# Patient Record
Sex: FEMALE | Race: WHITE | URBAN - METROPOLITAN AREA
[De-identification: names, ages, dates, MRNs, and addresses within clinical notes are randomized per-mention and may not be internally consistent; named-entity substitution may affect disease eponyms.]

---

## 2017-01-27 ENCOUNTER — TELEPHONE (OUTPATIENT)
Dept: FAMILY MEDICINE | Facility: OTHER | Age: 52
End: 2017-01-27

## 2017-01-27 NOTE — TELEPHONE ENCOUNTER
Summary:    Patient is due/failing the following:   MAMMOGRAM    Action needed:   Schedule a mammogram     Type of outreach:    Phone, left message for patient to call back.     Questions for provider review:    None                                                                                                                                    Caitlin Thompson       Chart routed to Care Team .      Panel Management Review      Patient has the following on her problem list: None      Composite cancer screening  Chart review shows that this patient is due/due soon for the following Mammogram

## 2017-03-17 ENCOUNTER — HOSPITAL ENCOUNTER (OUTPATIENT)
Dept: MAMMOGRAPHY | Facility: CLINIC | Age: 52
Discharge: HOME | End: 2017-03-17
Attending: FAMILY MEDICINE | Admitting: FAMILY MEDICINE
Payer: COMMERCIAL

## 2017-03-17 DIAGNOSIS — Z12.31 VISIT FOR SCREENING MAMMOGRAM: ICD-10-CM

## 2017-03-17 PROCEDURE — G0202 SCR MAMMO BI INCL CAD: HCPCS

## 2017-08-30 ENCOUNTER — TELEPHONE (OUTPATIENT)
Dept: FAMILY MEDICINE | Facility: OTHER | Age: 52
End: 2017-08-30
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
## 2017-08-31 NOTE — TELEPHONE ENCOUNTER
Spoke to patient who stated she does not have insurance and that is why she has not been seen. Patient said she is not working either but, patient did make an appointment.   Brisa Goddard CMA ([facility])

## 2017-08-31 NOTE — TELEPHONE ENCOUNTER
She can certainly request her records to submit as part of her petition, but I have not done a disability exam on her, and she's not seen me for over a year, so my information wouldn't be current anyway.  If she wants to pursue disability, I would recommend a visit with an occupational medicine doctor.  That is their specialty.
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
## 2017-09-21 ENCOUNTER — OFFICE VISIT (OUTPATIENT)
Dept: FAMILY MEDICINE | Facility: OTHER | Age: 52
End: 2017-09-21

## 2017-09-21 VITALS
DIASTOLIC BLOOD PRESSURE: 84 MMHG | BODY MASS INDEX: 32.39 KG/M2 | HEIGHT: 65 IN | RESPIRATION RATE: 16 BRPM | WEIGHT: 194.4 LBS | HEART RATE: 96 BPM | TEMPERATURE: 98.4 F | SYSTOLIC BLOOD PRESSURE: 108 MMHG

## 2017-09-21 DIAGNOSIS — G89.29 CHRONIC NECK PAIN: ICD-10-CM

## 2017-09-21 DIAGNOSIS — M79.10 MYALGIA: Primary | ICD-10-CM

## 2017-09-21 DIAGNOSIS — M54.2 CHRONIC NECK PAIN: ICD-10-CM

## 2017-09-21 PROCEDURE — 99214 OFFICE O/P EST MOD 30 MIN: CPT | Performed by: FAMILY MEDICINE

## 2017-09-21 RX ORDER — IBUPROFEN 800 MG/1
800 TABLET, FILM COATED ORAL EVERY 8 HOURS PRN
Qty: 270 TABLET | Refills: 1 | Status: SHIPPED | OUTPATIENT
Start: 2017-09-21

## 2017-09-21 RX ORDER — GABAPENTIN 600 MG/1
600 TABLET ORAL 3 TIMES DAILY
Qty: 270 TABLET | Refills: 3 | Status: CANCELLED | OUTPATIENT
Start: 2017-09-21

## 2017-09-21 RX ORDER — CYCLOBENZAPRINE HCL 10 MG
10 TABLET ORAL 2 TIMES DAILY PRN
Qty: 90 TABLET | Refills: 3 | Status: SHIPPED | OUTPATIENT
Start: 2017-09-21 | End: 2018-09-05

## 2017-09-21 RX ORDER — GABAPENTIN 300 MG/1
300 CAPSULE ORAL 3 TIMES DAILY
Qty: 270 CAPSULE | Refills: 3 | Status: SHIPPED | OUTPATIENT
Start: 2017-09-21 | End: 2018-04-05

## 2017-09-21 ASSESSMENT — PATIENT HEALTH QUESTIONNAIRE - PHQ9
SUM OF ALL RESPONSES TO PHQ QUESTIONS 1-9: 10
5. POOR APPETITE OR OVEREATING: SEVERAL DAYS

## 2017-09-21 ASSESSMENT — PAIN SCALES - GENERAL: PAINLEVEL: EXTREME PAIN (8)

## 2017-09-21 ASSESSMENT — ANXIETY QUESTIONNAIRES
IF YOU CHECKED OFF ANY PROBLEMS ON THIS QUESTIONNAIRE, HOW DIFFICULT HAVE THESE PROBLEMS MADE IT FOR YOU TO DO YOUR WORK, TAKE CARE OF THINGS AT HOME, OR GET ALONG WITH OTHER PEOPLE: NOT DIFFICULT AT ALL
GAD7 TOTAL SCORE: 4
1. FEELING NERVOUS, ANXIOUS, OR ON EDGE: SEVERAL DAYS
7. FEELING AFRAID AS IF SOMETHING AWFUL MIGHT HAPPEN: NOT AT ALL
6. BECOMING EASILY ANNOYED OR IRRITABLE: NOT AT ALL
2. NOT BEING ABLE TO STOP OR CONTROL WORRYING: SEVERAL DAYS
3. WORRYING TOO MUCH ABOUT DIFFERENT THINGS: SEVERAL DAYS
5. BEING SO RESTLESS THAT IT IS HARD TO SIT STILL: NOT AT ALL

## 2017-09-21 NOTE — NURSING NOTE
"Chief Complaint   Patient presents with     Discuss disability     Panel Management     Beatricerodneyt, Flu shot       Initial /84 (BP Location: Right arm, Patient Position: Chair, Cuff Size: Adult Regular)  Pulse 96  Temp 98.4  F (36.9  C) (Temporal)  Resp 16  Ht 5' 4.5\" (1.638 m)  Wt 194 lb 6.4 oz (88.2 kg)  BMI 32.85 kg/m2 Estimated body mass index is 32.85 kg/(m^2) as calculated from the following:    Height as of this encounter: 5' 4.5\" (1.638 m).    Weight as of this encounter: 194 lb 6.4 oz (88.2 kg).  Medication Reconciliation: complete  Abdelrahman Smallwood CMA    "

## 2017-09-21 NOTE — PATIENT INSTRUCTIONS
Thank you for visiting [facility]    Let's get you back on gabapentin to help with your pain and other symptoms.    I would recommend a consultation with occupational medicine if you'd like disability.  Sports medicine or neurology might also be options.    I will refer you to a care coordinator to help sort through things and hopefully help you get some insurance coverage.    Please see me in 1-3 months for follow up.       If you had imaging scheduled please refer to your radiology prep sheet.    Appointment    Date_______________     Time_____________    Day:   M TU W TH F    With____________________________    Location_________________________    If you need medication refills, please contact your pharmacy 3 days before your prescriptions runs out. If you are out of refills, your pharmacy will contact contact the clinic.    Contact us or return if questions or concerns.     -Your Care Team:  MD Vijaya Brown PA-C Kelly White, CNP    General information about your clinic      Clinic hours:     Lab hours:  Phone [phone]  Monday 7:30 am-7 pm    Monday 8:30 am-6:30 pm  Tuesday-Friday 7:30 am-5 pm   Tuesday-Friday 8:30 am-4:30 pm    Pharmacy hours:  Phone [phone]  Monday 8:30 am-7pm  Tuesday-Friday 8:30am-6 pm                                       Mychart assistance [phone]        We would like to hear from you, how was your visit today?    Gisell Chowdary  Patient Information Supervisor   Patient Care Supervisor  [facility] [place], and [facility], and [facility]  [phone]

## 2017-09-21 NOTE — MR AVS SNAPSHOT
After Visit Summary   9/21/2017    Zuri Reza    MRN: [id number]           Patient Information     Date Of Birth          1965        Visit Information        Provider Department      9/21/2017 1:15 PM Tom Cruz MD [facility]        Today's Diagnoses     Myalgia    -  1    Chronic neck pain          Care Instructions    Thank you for visiting [facility]    Let's get you back on gabapentin to help with your pain and other symptoms.    I would recommend a consultation with occupational medicine if you'd like disability.  Sports medicine or neurology might also be options.    I will refer you to a care coordinator to help sort through things and hopefully help you get some insurance coverage.    Please see me in 1-3 months for follow up.       If you had imaging scheduled please refer to your radiology prep sheet.    Appointment    Date_______________     Time_____________    Day:   M TU W TH F    With____________________________    Location_________________________    If you need medication refills, please contact your pharmacy 3 days before your prescriptions runs out. If you are out of refills, your pharmacy will contact contact the clinic.    Contact us or return if questions or concerns.     -Your Care Team:  MD Vijaya Brown, KUSH Candelaria, DIEGO    General information about your clinic      Clinic hours:     Lab hours:  Phone [phone]  Monday 7:30 am-7 pm    Monday 8:30 am-6:30 pm  Tuesday-Friday 7:30 am-5 pm   Tuesday-Friday 8:30 am-4:30 pm    Pharmacy hours:  Phone [phone]  Monday 8:30 am-7pm  Tuesday-Friday 8:30am-6 pm                                       Mychart assistance [phone]        We would like to hear from you, how was your visit today?    Gisell Chowdary  Patient Information Supervisor   Patient Care Supervisor  [facility], and [facility]  [place], and [facility]  [phone]             Follow-ups after your visit        Additional Services     CARE COORDINATION REFERRAL       Services are provided by a Care Coordinator for people with complex needs such as: medical, social, or financial troubles.  The Care Coordinator works with the patient and their Primary Care Provider to determine health goals, obtain resources, achieve outcomes, and develop care plans that help coordinate the patient's care.     Reason for Referral: Medication/Treatment Adherence Issues and Other Financial Concerns    Provide additional details for Care Coordination to best meet the patient's current needs: Pt is frustrated by lack of insurance, is looking at getting qualified for disability.      Clinical Staff have discussed the Care Coordination Referral with the patient and/or caregiver: yes            OCCUPATIONAL MEDICINE REFERRAL       Your provider has referred you to: [facility]  [phone]  [email or web address]  [facility] [phone] [email or web address]  [facility] [phone] [email or web address]    Please be aware that coverage of these services is subject to the terms and limitations of your health insurance plan.  Call member services at your health plan with any benefit or coverage questions.      Please bring the following to your appointment:  >>   Any x-rays, CTs or MRIs which have been performed.  Contact the facility where they were done to arrange for  prior to your scheduled appointment.    >>   List of current medications   >>   This referral request   >>   Any documents/labs given to you for this referral                  Who to contact     If you have questions or need follow up information about today's clinic visit or your schedule please contact  "[facility] directly at [phone].  Normal or non-critical lab and imaging results will be communicated to you by MyChart, letter or phone within 4 business days after the clinic has received the results. If you do not hear from us within 7 days, please contact the clinic through [employer] or phone. If you have a critical or abnormal lab result, we will notify you by phone as soon as possible.  Submit refill requests through [employer] or call your pharmacy and they will forward the refill request to us. Please allow 3 business days for your refill to be completed.          Additional Information About Your Visit        [employer] Information     [employer] lets you send messages to your doctor, view your test results, renew your prescriptions, schedule appointments and more. To sign up, go to [email or web address] . Click on \"Log in\" on the left side of the screen, which will take you to the Welcome page. Then click on \"Sign up Now\" on the right side of the page.     You will be asked to enter the access code listed below, as well as some personal information. Please follow the directions to create your username and password.     Your access code is: [email or web address]  Expires: 2017  1:37 PM     Your access code will  in 90 days. If you need help or a new code, please call your [place] clinic or [phone].        Care EveryWhere ID     This is your Care EveryWhere ID. This could be used by other organizations to access your [place] medical records  [id number]        Your Vitals Were     Pulse Temperature Respirations Height BMI (Body Mass Index)       96 98.4  F (36.9  C) (Temporal) 16 5' 4.5\" (1.638 m) 32.85 kg/m2        Blood Pressure from Last 3 Encounters:   17 108/84   10/07/16 101/77   16 106/72    Weight from Last 3 Encounters:   17 194 lb 6.4 oz (88.2 kg)   16 194 lb 6.4 oz (88.2 kg)   14 202 lb (91.6 kg)              We Performed the Following     CARE " COORDINATION REFERRAL     OCCUPATIONAL MEDICINE REFERRAL          Today's Medication Changes          These changes are accurate as of: 9/21/17  1:37 PM.  If you have any questions, ask your nurse or doctor.               These medicines have changed or have updated prescriptions.        Dose/Directions    * gabapentin 600 MG tablet   Commonly known as:  NEURONTIN   This may have changed:  Another medication with the same name was added. Make sure you understand how and when to take each.   Used for:  Chronic neck pain   Changed by:  Tom Cruz MD        Dose:  600 mg   Take 1 tablet (600 mg) by mouth 3 times daily   Quantity:  270 tablet   Refills:  3       * gabapentin 300 MG capsule   Commonly known as:  NEURONTIN   This may have changed:  You were already taking a medication with the same name, and this prescription was added. Make sure you understand how and when to take each.   Used for:  Chronic neck pain   Changed by:  Tom Cruz MD        Dose:  300 mg   Take 1 capsule (300 mg) by mouth 3 times daily   Quantity:  270 capsule   Refills:  3       ibuprofen 800 MG tablet   Commonly known as:  ADVIL/MOTRIN   This may have changed:  reasons to take this   Used for:  Chronic neck pain   Changed by:  Tom Cruz MD        Dose:  800 mg   Take 1 tablet (800 mg) by mouth every 8 hours as needed   Quantity:  270 tablet   Refills:  1       * Notice:  This list has 2 medication(s) that are the same as other medications prescribed for you. Read the directions carefully, and ask your doctor or other care provider to review them with you.         Where to get your medicines      [facility] [address] [place], [place] [address] [address] [address]     Phone:  [phone]     cyclobenzaprine 10 MG tablet    gabapentin 300 MG capsule    ibuprofen 800 MG tablet                Primary Care Provider Office Phone # Fax #    Tom Brito  MD Anthony [phone]       [address] [place] [address]        Equal Access to Services     LOBO LINDA : Hadmarva clemente breaux xi Michelle, wamagnoda luqheather, qaceliata kayanirada allan, debbie mcleod. So [facility] [phone].    ATENCIÓN: Si habla español, tiene a marquez disposición servicios gratuitos de asistencia lingüística. Llame al [phone].    We comply with applicable federal civil rights laws and [place] laws. We do not discriminate on the basis of race, color, national origin, age, disability sex, sexual orientation or gender identity.            Thank you!     Thank you for choosing [facility]  for your care. Our goal is always to provide you with excellent care. Hearing back from our patients is one way we can continue to improve our services. Please take a few minutes to complete the written survey that you may receive in the mail after your visit with us. Thank you!             Your Updated Medication List - Protect others around you: Learn how to safely use, store and throw away your medicines at www.disposemymeds.org.          This list is accurate as of: 9/21/17  1:37 PM.  Always use your most recent med list.                   Brand Name Dispense Instructions for use Diagnosis    * albuterol 108 (90 BASE) MCG/ACT Inhaler    PROAIR HFA/PROVENTIL HFA/VENTOLIN HFA    1 Inhaler    Inhale 2 puffs into the lungs every 4 hours as needed for shortness of breath / dyspnea.    Bronchitis with bronchospasm       * albuterol (2.5 MG/3ML) 0.083% neb solution     1 Box    Take 3 mLs by nebulization every 4 hours as needed for shortness of breath / dyspnea.    Cough       cyclobenzaprine 10 MG tablet    FLEXERIL    90 tablet    Take 1 tablet (10 mg) by mouth 2 times daily as needed for muscle spasms    Chronic neck pain       fluticasone 50 MCG/ACT spray    FLONASE    1 Package    Spray 1-2 sprays into both nostrils daily    Rhinitis, non-allergic        * gabapentin 600 MG tablet    NEURONTIN    270 tablet    Take 1 tablet (600 mg) by mouth 3 times daily    Chronic neck pain       * gabapentin 300 MG capsule    NEURONTIN    270 capsule    Take 1 capsule (300 mg) by mouth 3 times daily    Chronic neck pain       ibuprofen 800 MG tablet    ADVIL/MOTRIN    270 tablet    Take 1 tablet (800 mg) by mouth every 8 hours as needed    Chronic neck pain       * Notice:  This list has 4 medication(s) that are the same as other medications prescribed for you. Read the directions carefully, and ask your doctor or other care provider to review them with you.

## 2017-09-22 ENCOUNTER — CARE COORDINATION (OUTPATIENT)
Dept: CARE COORDINATION | Facility: CLINIC | Age: 52
End: 2017-09-22

## 2017-09-22 ASSESSMENT — ANXIETY QUESTIONNAIRES: GAD7 TOTAL SCORE: 4

## 2017-09-28 ENCOUNTER — TELEPHONE (OUTPATIENT)
Dept: FAMILY MEDICINE | Facility: OTHER | Age: 52
End: 2017-09-28

## 2017-09-28 DIAGNOSIS — R51.9 NONINTRACTABLE HEADACHE, UNSPECIFIED CHRONICITY PATTERN, UNSPECIFIED HEADACHE TYPE: ICD-10-CM

## 2017-09-28 DIAGNOSIS — M54.2 CHRONIC NECK PAIN: Primary | ICD-10-CM

## 2017-09-28 DIAGNOSIS — M79.10 MYALGIA: ICD-10-CM

## 2017-09-28 DIAGNOSIS — G89.29 CHRONIC NECK PAIN: Primary | ICD-10-CM

## 2017-09-28 NOTE — TELEPHONE ENCOUNTER
Pt called and wanted to let DJ that she has been approved for [place] care . She stated she will be coming in next week to drop off some paperwork . She just wanted to give you a heads up . She also stated now that she has insurance she would like the MRI done she was suppose to have 2 years ago . Please call and advice the next steps . Pt stated she is over whelmed and would like to discuss

## 2017-09-29 NOTE — TELEPHONE ENCOUNTER
Spoke with patient, informed her of message below, Patient states yes she is claustrophobic but she has had MRI before and she just dealt with it.   Patient will call to set up MRI, phone number given.  Patient also states that she has a form that she will need completed by you, declined appointment at this time, she will call once she gets the form to set up appointment.   Thanks  Kirsten Roach RT (R)

## 2017-10-02 ENCOUNTER — TELEPHONE (OUTPATIENT)
Dept: FAMILY MEDICINE | Facility: OTHER | Age: 52
End: 2017-10-02

## 2017-10-02 NOTE — TELEPHONE ENCOUNTER
Form has been faxed, sent to scanning and copy placed in Dr Cruz fax folder  Closing encounter    Kirsten GAMBINO (R)

## 2017-10-02 NOTE — TELEPHONE ENCOUNTER
Reason for Call:  Form, our goal is to have forms completed with 72 hours, however, some forms may require a visit or additional information.    Type of letter, form or note:  medical    Who is the form from?: [place] [employer] insurance (if other please explain)    Where did the form come from: Patient or family brought in       What clinic location was the form placed at?: [facility] - [phone]    Where the form was placed: 's Box    What number is listed as a contact on the form?: [phone]       Additional comments: n/a    Call taken on 10/2/2017 at 10:18 AM by Mirela Gee

## 2017-10-06 NOTE — TELEPHONE ENCOUNTER
Spoke with pt and she stated they only received the first page, please refax.    Helene Ortiz CMA ([facility])

## 2017-10-16 ENCOUNTER — TELEPHONE (OUTPATIENT)
Dept: FAMILY MEDICINE | Facility: OTHER | Age: 52
End: 2017-10-16

## 2017-10-16 NOTE — TELEPHONE ENCOUNTER
Reason for Call:  Other form    Detailed comments: patient is wanting a copy of her unemployment for that Dr Cruz filled out mailed to her home address.    Phone Number Patient can be reached at: Home number on file [phone] (home) or Cell number on file:    Telephone Information:   Mobile [phone]       Best Time: anytime    Can we leave a detailed message on this number? YES    Call taken on 10/16/2017 at 8:49 AM by Mirela Gee

## 2017-10-18 ENCOUNTER — CARE COORDINATION (OUTPATIENT)
Dept: CARE COORDINATION | Facility: CLINIC | Age: 52
End: 2017-10-18

## 2017-10-18 NOTE — PROGRESS NOTES
Clinic Care Coordination Contact 10/18/17  [facility]/[place]-    Referral Source: PCP  Clinical Data: Care Coordinator Outreach  Outreach attempted x 1.  Left message on voicemail with call back information and requested return call.  Plan: Care Coordinator will try to reach patient again in 3-5 business days.    GEREMIAS Moe  Care Coordinator Social Work    [facility] [place]  [phone]  10/18/2017 4:16 PM

## 2017-10-19 NOTE — PROGRESS NOTES
Clinic Care Coordination Contact 10/19/17  Care Team Conversations-SW    Phone call received from pt who states she was able [place] [employer] [place] which will be effective on 11/1/17. She states she is also eligible for [place] and someone through [place] [employer] helped her to obtain this. She mentioned that she has been on the phone a lot lately talking with different people and isn't exactly sure who she is talking to anymore. She completed her SSDI application as well and contacted an  to discuss the process and ask for help if needed. I shared with her that she can possibly get her mileage to and from clinic apt's reimbursed through her insurance and encouraged her to call her ins once it is active. She states she has also contacted the county to see if she can get any county assistance as she was laid off from her job recently and cut off from unemployment. I encouraged her to connect with the [employer] to seek advice about her lay off.     Pt was able to get a neurology apt which is scheduled on 11/2/17. We talked about a pain clinic and she said that she cannot afford any medications at this time. We will discuss this service at a later conversation.       Erin Claire, [facility]  Care Coordinator Social Work    [facility] [place]  [phone]  10/19/2017 11:31 AM

## 2017-11-02 ENCOUNTER — HOSPITAL ENCOUNTER (OUTPATIENT)
Dept: MRI IMAGING | Facility: CLINIC | Age: 52
Discharge: HOME | End: 2017-11-02
Attending: FAMILY MEDICINE | Admitting: FAMILY MEDICINE
Payer: COMMERCIAL

## 2017-11-02 ENCOUNTER — CARE COORDINATION (OUTPATIENT)
Dept: CARE COORDINATION | Facility: CLINIC | Age: 52
End: 2017-11-02

## 2017-11-02 DIAGNOSIS — R51.9 NONINTRACTABLE HEADACHE, UNSPECIFIED CHRONICITY PATTERN, UNSPECIFIED HEADACHE TYPE: ICD-10-CM

## 2017-11-02 DIAGNOSIS — G89.29 CHRONIC NECK PAIN: ICD-10-CM

## 2017-11-02 DIAGNOSIS — M54.2 CHRONIC NECK PAIN: ICD-10-CM

## 2017-11-02 DIAGNOSIS — M79.10 MYALGIA: ICD-10-CM

## 2017-11-02 PROCEDURE — 72141 MRI NECK SPINE W/O DYE: CPT

## 2017-11-03 ENCOUNTER — TELEPHONE (OUTPATIENT)
Dept: FAMILY MEDICINE | Facility: OTHER | Age: 52
End: 2017-11-03

## 2017-11-03 NOTE — TELEPHONE ENCOUNTER
Please schedule visit.  If she'd prefer, I'd be happy to refer her to sports medicine or spine.  Neurology can also go over these results with her in more detail.

## 2017-11-03 NOTE — TELEPHONE ENCOUNTER
Left detailed message for patient to call and schedule appointment with Dr Cruz or Sports Med or Spine, also Neurology can give results in more detail  Kirsten Roach RT (R)

## 2017-11-03 NOTE — LETTER
[facility]  [address] [place] [id number] [phone]        November 6, 2017    Zuri Reza  [address] [place] [id number]              Dear Zuri Reza      IMAGING RESULTS:     The results of your recent cervical spine MRI are enclosed.  If you have any further questions or problems, please contact our office.        Sincerely,        Tom Cruz MD

## 2017-11-06 NOTE — TELEPHONE ENCOUNTER
Spoke with patient she has appointment with Neurology on 11/17/2017 she will ask them to review her results at that appointment.   She also wanted me to let you know that she is still in a lot of pain, it starts about 20 minutes after she gets out of bed, it starts in her shoulder blades then goes out to the tips of her shoulders.  She has not picked up her medication yet cause she has not had money to do that, she also did not have insurance until November 1st. So she is hoping she will be able to get her medication soon.  Patient has no further questions at this time, and after her neurology appointment If she has more questions for you she will call and schedule appointment with you at that time  I have mailed her a copy of her MRI Cervical Spine Results  Kirsten Roach RT (R)

## 2017-11-07 NOTE — TELEPHONE ENCOUNTER
She should be able to fill the flexeril, ibuprofen, and gabapentin Rx that were given to her at her last visit.  She should try these first to help with her pain.

## 2017-11-10 ENCOUNTER — CARE COORDINATION (OUTPATIENT)
Dept: CARE COORDINATION | Facility: CLINIC | Age: 52
End: 2017-11-10

## 2017-11-10 NOTE — PROGRESS NOTES
Clinic Care Coordination Contact 11/10/17  Care Team Conversations-SW    Received a phone call from the pt who wanted to explain her frustrations. She states she received her MRI results which indicates that she had no significant changes in her spine. She said the RN also told her that her prescription was ready to . She explained how broke she has been as she lost her job in September and has been struggling to make ends meet. She was able to get insurance and she states that they told her it will change [place] soon rather [place].     Conversation with pt about what she was needing help with as she seemed like she was venting about a wide variety of topics. She explained her goal was to find a reason for her pain- a diagnosis. She has an apt with a neurologist on 11/21/17. We talked a lot about pain clinics, PT and use of a variety of medications (not just narcotics) to control the pain.     [facility] will contact the pt in approx 1 month to discuss neurology results/information.    GEREMIAS Moe  Care Coordinator Social Work    [facility]  [phone]  11/10/2017 11:37 AM

## 2017-11-21 ENCOUNTER — TRANSFERRED RECORDS (OUTPATIENT)
Dept: HEALTH INFORMATION MANAGEMENT | Facility: CLINIC | Age: 52
End: 2017-11-21

## 2017-11-28 ENCOUNTER — TELEPHONE (OUTPATIENT)
Dept: FAMILY MEDICINE | Facility: OTHER | Age: 52
End: 2017-11-28

## 2017-11-28 DIAGNOSIS — G89.29 CHRONIC NECK PAIN: Primary | ICD-10-CM

## 2017-11-28 DIAGNOSIS — M54.2 CHRONIC NECK PAIN: Primary | ICD-10-CM

## 2017-11-28 NOTE — TELEPHONE ENCOUNTER
She can schedule any type of visit to go over these in more detail.    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  There is only one area that may be mildly impinging on a nerve on the right side, but this typically would respond well to the gabapentin that's been prescribed.      I really don't think that most of her symptoms are related to her spinal cord, but could refer to spine surgery if she wants to pursue that.  I don't really recommend it at this point.

## 2017-11-28 NOTE — TELEPHONE ENCOUNTER
Patient called in she states that she had appointment with neurology but when she asked neurologist to go over MRI results she didn't, she is wondering if you can go over her MRI results in more detail, also states that neurologist told her that she needs to see a spine specialist. Patient states that she is still having numbness and tingling in her arm and has neck and back issues.  She would like your thoughts and advice.     Patient was also given phone number to billing department [phone] to get her bill figured out since when she was in the last time she did not have insurance information or card with her.   Patient was also given Erin Claire Care coordinator phone number as she has been in contact with her but did not have her number.   Thanks  Kirsten Roach RT (R)

## 2017-11-29 NOTE — TELEPHONE ENCOUNTER
Informed patient of message below, patient states she is in pain everyday and is going to meet with a spine specialist, to see what they suggest.   Patient states she would like to try a pain patch, she got a call from  [facility] offering this medication to her, we also received a fax from them requesting this medication, I informed patient that she would most likely need an appointment to start a new medication, but patient wanted me to ask you what your thoughts are. Patient states her schedule is getting really tight, with her daughter getting , umer coming, her having to deal with her back pain and they recently found something on her eye that she needs to see a specialist for.  If she needs to be seen she is wondering if you would be able to work her in, she can't handle her back pain and she doesn't want to continue taking pills for it.   Please advise I have placed the pharmacy form in your inbox  Thanks  Kirsten Roach RT (R)

## 2017-11-29 NOTE — TELEPHONE ENCOUNTER
Haven't seen the form as I'm out of the office for a couple of days, but I'd probably recommend she be seen to start this patch.  Spine visit is fine, but I really think that surgery is not going to be the fix she's hoping for.  Has she done PT lately?  This would be more likely to fix her pain without pills if pills are what bothers her.

## 2017-11-29 NOTE — TELEPHONE ENCOUNTER
Spoke with pt and gave information below. Pt states she is not doing surgery again on her back as the last one did nothing for her. She stated she will do the spine specialist and would like that referral as that is also what the neurologist said she should do to. She was told to call a lady from neurology back at [phone] but was not told why. Attempted to call back neurology for pt but was on hold for about 10-15 minutes; will try again later. Declined making an appt with Dr Cruz at this time and would like us to do referral to spine specialist per Dr Cruz recommendation. Ok LM if she does not answer with appt day and time for spine specialist as she would like us to schedule this for her. Provider please review and place referral.    Helene Ortiz CMA (AAMA)

## 2017-12-01 NOTE — TELEPHONE ENCOUNTER
Left message for patient to return call.  Please ask if she wants these medications sent to [facility] pharmacy that is on form.  Unsure what her insurance will cover.  Form on Kirsten's desk.    Angel Marrero RN, BSN

## 2017-12-04 NOTE — TELEPHONE ENCOUNTER
Provider please review and place referral for Spine specialist if appropriate, thanks  Kirsten Roach RT (R)

## 2017-12-04 NOTE — TELEPHONE ENCOUNTER
Spoke with patient informed her of message below, patient states she will schedule with spine specialist once they call. In regards to the form from [facility] patient states she was told this would be for a lidocaine patch not ointment, she is going to call her insurance to see if a patch would be covered and she will also call [facility] to discuss with them  Closing encounter  Kirsten Roach RT (R)

## 2017-12-04 NOTE — TELEPHONE ENCOUNTER
Referral placed.  Please clarify with pt that she wants Rx from [facility].  These may not be cost-effective for patient when compared to what she's already chosen not to fill.

## 2017-12-04 NOTE — TELEPHONE ENCOUNTER
Left detailed message for patient stating referral has been placed for Spine Specialist, also asked her to call us back in regards to the form we received from [facility] for Lidocaine 5% ointment, Diclofenac 3% gel 100g, diclofenac Sodium 1.5% Topical solution, and Sterile alcohol prep pads 100/box.  Form is on Kirsten's desk, thanks  Kirsten GAMBINO (R)

## 2017-12-11 ENCOUNTER — CARE COORDINATION (OUTPATIENT)
Dept: CARE COORDINATION | Facility: CLINIC | Age: 52
End: 2017-12-11

## 2017-12-11 NOTE — PROGRESS NOTES
Clinic Care Coordination Contact 12/11/17  [facility]-    Referral Source: PCP  Clinical Data: Care Coordinator Outreach  Outreach attempted x 1.  Left message on voicemail with call back information and requested return call.  Plan:  Care Coordinator will try to reach patient again in 3-5 business days.    GEREMIAS Moe  Care Coordinator Social Work    [facility] [place] [facility] [place]  [phone]  12/11/2017 2:21 PM

## 2017-12-28 NOTE — PROGRESS NOTES
Clinic Care Coordination Contact 12/28/17  [facility]    Referral Source: PCP  Clinical Data: Care Coordinator Outreach  Outreach attempted x 2.  Left message on voicemail with call back information and requested return call.  Plan: Care Coordinator mailed out letter on 12/28/17. Care Coordinator will do no further outreaches at this time.    GEREMIAS Moe  Care Coordinator Social Work    [facility] and [place]  [phone]  12/28/2017 12:48 PM

## 2018-01-11 ENCOUNTER — OFFICE VISIT (OUTPATIENT)
Dept: NEUROSURGERY | Facility: CLINIC | Age: 53
End: 2018-01-11
Payer: COMMERCIAL

## 2018-01-11 VITALS — TEMPERATURE: 97.6 F | WEIGHT: 199 LBS | BODY MASS INDEX: 33.15 KG/M2 | HEIGHT: 65 IN

## 2018-01-11 DIAGNOSIS — M54.2 CERVICALGIA: Primary | ICD-10-CM

## 2018-01-11 DIAGNOSIS — M79.601 RIGHT ARM PAIN: ICD-10-CM

## 2018-01-11 PROCEDURE — 99204 OFFICE O/P NEW MOD 45 MIN: CPT | Performed by: PHYSICIAN ASSISTANT

## 2018-01-11 ASSESSMENT — PAIN SCALES - GENERAL: PAINLEVEL: EXTREME PAIN (8)

## 2018-01-11 NOTE — PROGRESS NOTES
"Zuri Reza is a 52 year old female who presents for:  Chief Complaint   Patient presents with     Neurologic Problem     Neck pain w/ right arm pain & headaches and blurred vision        Initial Vitals:  Temp 97.6  F (36.4  C) (Temporal)  Ht 5' 4.5\" (1.638 m)  Wt 199 lb (90.3 kg)  BMI 33.63 kg/m2 Estimated body mass index is 33.63 kg/(m^2) as calculated from the following:    Height as of this encounter: 5' 4.5\" (1.638 m).    Weight as of this encounter: 199 lb (90.3 kg).. Body surface area is 2.03 meters squared. BP completed using cuff size: NA (Not Taken)  Extreme Pain (8)    Do you feel safe in your environment?  Yes  Do you need any refills today? No    Nursing Comments:         Socorro Smith CMA    "

## 2018-01-11 NOTE — MR AVS SNAPSHOT
"              After Visit Summary   1/11/2018    Zuri Reza    MRN: [id number]           Patient Information     Date Of Birth          1965        Visit Information        Provider Department      1/11/2018 2:50 PM Diana Desouza PA-C [facility]        Today's Diagnoses     Cervicalgia    -  1    Right arm pain           Follow-ups after your visit        Your next 10 appointments already scheduled     Feb 01, 2018  1:30 PM CST   New Visit with Diana Desouza PA-C   [facility] ([facility])    [address] [place] [id number] [phone]              Who to contact     If you have questions or need follow up information about today's clinic visit or your schedule please contact [facility] directly at [phone].  Normal or non-critical lab and imaging results will be communicated to you by [employer], letter or phone within 4 business days after the clinic has received the results. If you do not hear from us within 7 days, please contact the clinic through MyChart or phone. If you have a critical or abnormal lab result, we will notify you by phone as soon as possible.  Submit refill requests through [facility] or call your pharmacy and they will forward the refill request to us. Please allow 3 business days for your refill to be completed.          Additional Information About Your Visit        MyChart Information     [facility] lets you send messages to your doctor, view your test results, renew your prescriptions, schedule appointments and more. To sign up, go to [email or web address] . Click on \"Log in\" on the left side of the screen, which will take you to the Welcome page. Then click on \"Sign up Now\" on the right side of the page.     You will be asked to enter the access code listed below, as well as some personal information. Please follow the directions to create your username and password.     Your access code is: " [email or web address]  Expires: 2018  3:34 PM     Your access code will  in 90 days. If you need help or a new code, please call your [place] clinic or [phone].        Care EveryWhere ID     This is your Care EveryWhere ID. This could be used by other organizations to access your [place] medical records  [id number]        Your Vitals Were     Temperature Height BMI (Body Mass Index)             97.6  F (36.4  C) (Temporal) 5' 4.5\" (1.638 m) 33.63 kg/m2          Blood Pressure from Last 3 Encounters:   17 108/84   10/07/16 101/77   16 106/72    Weight from Last 3 Encounters:   18 199 lb (90.3 kg)   17 194 lb 6.4 oz (88.2 kg)   16 194 lb 6.4 oz (88.2 kg)              Today, you had the following     No orders found for display       Primary Care Provider Office Phone # Fax #    Tom Cruz [phone] [address] [place] [address]        Equal Access to Services     [facility]: Hadii aad ku hadasho Soomaali, waaxda luqadaha, qaybta kaalmada adeegyada, debbie mcleod. So [facility] [phone].    ATENCIÓN: Si habla español, tiene a marquez disposición servicios gratuitos de asistencia lingüística. Llame al [phone].    We comply with applicable federal civil rights laws and [place] laws. We do not discriminate on the basis of race, color, national origin, age, disability, sex, sexual orientation, or gender identity.            Thank you!     Thank you for choosing [facility]  for your care. Our goal is always to provide you with excellent care. Hearing back from our patients is one way we can continue to improve our services. Please take a few minutes to complete the written survey that you may receive in the mail after your visit with us. Thank you!             Your Updated Medication List - Protect others around you: Learn how to safely use, store and throw away your medicines at " www.disposemymeds.org.          This list is accurate as of: 1/11/18  3:34 PM.  Always use your most recent med list.                   Brand Name Dispense Instructions for use Diagnosis    * albuterol 108 (90 BASE) MCG/ACT Inhaler    PROAIR HFA/PROVENTIL HFA/VENTOLIN HFA    1 Inhaler    Inhale 2 puffs into the lungs every 4 hours as needed for shortness of breath / dyspnea.    Bronchitis with bronchospasm       * albuterol (2.5 MG/3ML) 0.083% neb solution     1 Box    Take 3 mLs by nebulization every 4 hours as needed for shortness of breath / dyspnea.    Cough       cyclobenzaprine 10 MG tablet    FLEXERIL    90 tablet    Take 1 tablet (10 mg) by mouth 2 times daily as needed for muscle spasms    Chronic neck pain       fluticasone 50 MCG/ACT spray    FLONASE    1 Package    Spray 1-2 sprays into both nostrils daily    Rhinitis, non-allergic       * gabapentin 600 MG tablet    NEURONTIN    270 tablet    Take 1 tablet (600 mg) by mouth 3 times daily    Chronic neck pain       * gabapentin 300 MG capsule    NEURONTIN    270 capsule    Take 1 capsule (300 mg) by mouth 3 times daily    Chronic neck pain       ibuprofen 800 MG tablet    ADVIL/MOTRIN    270 tablet    Take 1 tablet (800 mg) by mouth every 8 hours as needed    Chronic neck pain       * Notice:  This list has 4 medication(s) that are the same as other medications prescribed for you. Read the directions carefully, and ask your doctor or other care provider to review them with you.
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
## 2018-01-31 ENCOUNTER — TELEPHONE (OUTPATIENT)
Dept: NEUROSURGERY | Facility: CLINIC | Age: 53
End: 2018-01-31

## 2018-01-31 DIAGNOSIS — M79.601 RIGHT ARM PAIN: ICD-10-CM

## 2018-01-31 DIAGNOSIS — M54.2 CERVICALGIA: Primary | ICD-10-CM

## 2018-01-31 NOTE — TELEPHONE ENCOUNTER
Patient called with questions on next steps in plan of care. She last saw Diana Desouza PA-C in clinic on 1/11/18, who recommended right arm EMG and pain management referral. Patient would like to proceed with both. Placed orders. Provided her with the number to schedule with [facility].    Patient voiced that she would like follow up appts to be with Jesus Patterson PA-C. Once EMG is complete, patient will call our clinic to schedule follow up with Dante.

## 2018-02-01 ENCOUNTER — TELEPHONE (OUTPATIENT)
Dept: PALLIATIVE MEDICINE | Facility: CLINIC | Age: 53
End: 2018-02-01

## 2018-02-01 NOTE — TELEPHONE ENCOUNTER
Patient will call back when she is ready to schedule. She states she has a lot going on with family at the moment. Clinic number given.         Esperanza LEHMAN    [facility]

## 2018-02-07 NOTE — PROGRESS NOTES
SUBJECTIVE:   Zuri Reza is a 52 year old female who presents to clinic today for the following health issues:    Follow up MRI    HPI  Neck Pain      Duration: 30 years    Description:  Location: neck  Radiation: into the right neck, into the right shoulder and into the right hand    Intensity:  severe    Accompanying signs and symptoms: none    History (similar episodes/previous evaluation): yes    Precipitating or alleviating factors: none    Therapies tried and outcome: physical therapy and injections    Problem list and histories reviewed & adjusted, as indicated.  Additional history: as documented    Pt thinks that the numbness in her right arm started last summer.  She reports numbness goes from her mid-humerus down the top of her arm and involving her 4 radial digits.  Can't feel her hand in the morning when wakes up.      She does have an EMG scheduled on March 2.      She's taking 2 gabapentin at night.  She does report some help from this.  Allows her sleep.  Also takes Flexeril every night.  Taking ibuprofen at least once/day.      Has pain all day long, tires easily.  Pain starts in her upper neck, near occiput.  Radiates down neck, into shoulders and down into her lower back, spreading further and further.    Also has pain in her right arm when she picks things up and radiates up into her neck.      She does daily stretches.  She states she went to [facility] about 2 months ago.  Went about 6-7 times.      She does remember trying trigger point injections and this didn't help that much.     She plans to see a chiropractor.      Current Outpatient Prescriptions   Medication Sig Dispense Refill     cyclobenzaprine (FLEXERIL) 10 MG tablet Take 1 tablet (10 mg) by mouth 2 times daily as needed for muscle spasms 90 tablet 3     ibuprofen (ADVIL/MOTRIN) 800 MG tablet Take 1 tablet (800 mg) by mouth every 8 hours as needed 270 tablet 1     gabapentin (NEURONTIN) 300 MG capsule Take 1 capsule (300  "mg) by mouth 3 times daily 270 capsule 3     gabapentin (NEURONTIN) 600 MG tablet Take 1 tablet (600 mg) by mouth 3 times daily 270 tablet 3     fluticasone (FLONASE) 50 MCG/ACT nasal spray Spray 1-2 sprays into both nostrils daily 1 Package 11     albuterol (2.5 MG/3ML) 0.083% nebulizer solution Take 3 mLs by nebulization every 4 hours as needed for shortness of breath / dyspnea. 1 Box 0     albuterol (PROVENTIL HFA: VENTOLIN HFA) 108 (90 BASE) MCG/ACT inhaler Inhale 2 puffs into the lungs every 4 hours as needed for shortness of breath / dyspnea. 1 Inhaler 3     Recent Labs   Lab Test  08/24/16   1415  12/14/10   1024  03/16/10   1010   LDL  92  122   --    HDL  60  52   --    TRIG  124  88   --    ALT  30   --    --    CR  0.95  0.76   --    GFRESTIMATED  62  82   --    GFRESTBLACK  75  >90   --    POTASSIUM  3.9  4.5  4.2   TSH   --    --   1.28      BP Readings from Last 3 Encounters:   02/14/18 118/70   09/21/17 108/84   10/07/16 101/77    Wt Readings from Last 3 Encounters:   02/14/18 199 lb (90.3 kg)   01/11/18 199 lb (90.3 kg)   09/21/17 194 lb 6.4 oz (88.2 kg)                 ROS:  Constitutional, HEENT, cardiovascular, pulmonary, gi and gu systems are negative, except as otherwise noted.    OBJECTIVE:     /70 (BP Location: Right arm, Patient Position: Chair, Cuff Size: Adult Large)  Pulse 68  Temp 98.1  F (36.7  C) (Temporal)  Ht 5' 4.5\" (1.638 m)  Wt 199 lb (90.3 kg)  BMI 33.63 kg/m2  Body mass index is 33.63 kg/(m^2).  GENERAL: healthy, alert and no distress  NECK: no adenopathy, no asymmetry, masses, or scars and thyroid normal to palpation  RESP: lungs clear to auscultation - no rales, rhonchi or wheezes  CV: regular rate and rhythm, normal S1 S2, no S3 or S4, no murmur, click or rub, no peripheral edema and peripheral pulses strong  ABDOMEN: soft, nontender, no hepatosplenomegaly, no masses and bowel sounds normal  MS: neck stiffness and tenderness bilaterally, but worse on right.  " Positive Phalen's test on exam.  Tinel's is negative for carpal, equivocal cubital tunnel.  Mild medial epicondyle tenderness  NEURO: Normal strength and tone, mentation intact and speech normal    Diagnostic Test Results:  Results for orders placed or performed during the hospital encounter of 11/02/17   MR Cervical Spine w/o Contrast    Narrative    MRI CERVICAL SPINE WITHOUT CONTRAST November 2, 2017 10:18 AM     HISTORY: Neck pain and right greater than left.    TECHNIQUE: Multiplanar, multisequence MRI of the cervical spine  without contrast.     COMPARISON: None.    FINDINGS: Postoperative changes of anterior cervical spine fusion at  C4-C5. Surgical hardware causes susceptibility artifact which slightly  limits evaluation in that area.     There is straightening of the normal cervical lordosis. Anterior  posterior alignment of the spine is within normal limits. Vertebral  body height is maintained without evidence of fracture. There are no  destructive osseous lesions. There is mild loss of intervertebral disc  space with disc desiccation at C5-C6 and C6-C7 and to a lesser extent  C3-C4.    The cervical spinal cord and visualized portions of the thoracic  spinal cord appear normal. The visualized portions of the brainstem  and cerebellum appear normal.    Level by level as follows:    C2-C3: Left greater than right facet hypertrophy without spinal canal  or neural foraminal narrowing.     C3-C4: Left-sided facet hypertrophy causes mild left neural foraminal  narrowing. No significant right neural foraminal narrowing. No spinal  canal stenosis.     C4-C5: Postoperative changes of anterior fusion. No obvious spinal  canal or neural foraminal narrowing appreciated.    C5-C6: Small posterior disc osteophyte complex without significant  spinal canal narrowing. Right greater than left uncinate spurring and  facet hypertrophy results in mild right neural foraminal narrowing. No  significant left neural foraminal  "narrowing.    C6-C7: Circumferential disc bulge without significant spinal canal  narrowing. Left greater than right facet hypertrophy without  significant neural foraminal narrowing.     C7-T1: Bilateral facet hypertrophy left greater than right without  significant spinal canal or neural foraminal narrowing.     Paraspinous soft tissues are unremarkable.      Impression    IMPRESSION:    1. Postoperative changes of anterior cervical spine fusion from C4 to  C5.  2. Degenerative changes in the cervical spine as described above.      PALAK PHILLIPS MD       ASSESSMENT/PLAN:     BMI:   Estimated body mass index is 33.63 kg/(m^2) as calculated from the following:    Height as of this encounter: 5' 4.5\" (1.638 m).    Weight as of this encounter: 199 lb (90.3 kg).   Weight management plan: Discussed healthy diet and exercise guidelines and patient will follow up in 12 months in clinic to re-evaluate.        ICD-10-CM    1. Myalgia M79.1 DULoxetine (CYMBALTA) 20 MG EC capsule   2. Chronic neck pain M54.2 DULoxetine (CYMBALTA) 20 MG EC capsule    G89.29    3. Medial epicondylitis of elbow, left M77.02    4. Left upper extremity numbness R20.0 DULoxetine (CYMBALTA) 20 MG EC capsule   5. Need for prophylactic vaccination and inoculation against influenza Z23 FLU VAC, SPLIT VIRUS IM > 3 YO (QUADRIVALENT) [58647]     1,2,4.  Extensively reviewed patient's records and her previous treatments and evaluations with her.  Discussed that there was evidence that at least some of her pain is related to fibromyalgia.  Reviewed that there are no major abnormalities on her MRI that would explain all of her symptoms.  Discussed that given the duration of her symptoms and the distribution of the, it would be unlikely to find a single intervention that would reverse all of her pain and other challenges.  Will continue gabapentin at this time.  I did recommend that she follow through with her EMG to help further evaluate her symptoms.  Also " "discussed a trial of Cymbalta as this would likely be beneficial for fibromyalgia, which I suspect is a significant part of her symptoms.  Discussed chiropractic, acupuncture, physical therapy, and medications may also play a role in controlling her symptoms.  I also suspect that some of her left upper extremity numbness is related to carpal tunnel syndrome.  Because of this we will do a trial of a wrist splint pending results of her EMG.  Follow-up in 1-2 months.  3.  Discussed trial of elbow strap to help with this.  Can also use NSAIDs.  If not responding, we could consider injection or referral to orthopedics.  5.  Immunized.    Portions of this note were completed using Dragon dictation software.  Although reviewed, there may be typographical and other inadvertent errors that remain.               Patient Instructions   Thank you for visiting [facility]    Get your EMG as discussed.    Try the wrist splint, the elbow towel at night to help with your numbness.    Can also try elbow strap for your golfer's elbow while lifting.    Start Cymbalta to help with your pain and numbness.  We can gradually increase your dose.  Can take along with the flexeril and gabapentin if needed.    See chiropractic as discussed.      Please see me in 1-2 months for follow up. Options are       (a) office visit       (b) scheduled phone visit (check with your insurance about coverage)       (c) E-visit (need to \"request an E-visit\" through [employer])      If you had imaging scheduled please refer to your radiology prep sheet.    Appointment    Date_______________     Time_____________    Day:   M TU W TH F    With____________________________    Location_________________________    If you need medication refills, please contact your pharmacy 3 days before your prescriptions runs out. If you are out of refills, your pharmacy will contact contact the clinic.    Contact us or return if questions or concerns. "     -Your Care Team:  MD Vijaya Brown PA-C Joel De Haan, PA-C Elizabeth McLean, APRN CNP    General information about your clinic      Clinic hours:     Lab hours:  Phone [phone]  Monday 7:30 am-7 pm    Monday 8:30 am-6:30 pm  Tuesday-Friday 7:30 am-5 pm   Tuesday-Friday 8:30 am-4:30 pm    Pharmacy hours:  Phone [phone]  Monday 8:30 am-7pm  Tuesday-Friday 8:30am-6 pm                                       Mychart assistance [phone]        We would like to hear from you, how was your visit today?    Gisell Chowdary  Patient Information Supervisor   Patient Care Supervisor  [facility], and hospitals, [facility]  [phone]         Tom Cruz MD, MD  [facility]

## 2018-02-14 ENCOUNTER — TELEPHONE (OUTPATIENT)
Dept: FAMILY MEDICINE | Facility: OTHER | Age: 53
End: 2018-02-14

## 2018-02-14 ENCOUNTER — OFFICE VISIT (OUTPATIENT)
Dept: FAMILY MEDICINE | Facility: OTHER | Age: 53
End: 2018-02-14
Payer: COMMERCIAL

## 2018-02-14 VITALS
SYSTOLIC BLOOD PRESSURE: 118 MMHG | HEIGHT: 65 IN | BODY MASS INDEX: 33.15 KG/M2 | HEART RATE: 68 BPM | WEIGHT: 199 LBS | DIASTOLIC BLOOD PRESSURE: 70 MMHG | TEMPERATURE: 98.1 F

## 2018-02-14 DIAGNOSIS — G89.29 CHRONIC NECK PAIN: ICD-10-CM

## 2018-02-14 DIAGNOSIS — M79.10 MYALGIA: Primary | ICD-10-CM

## 2018-02-14 DIAGNOSIS — R20.0 LEFT UPPER EXTREMITY NUMBNESS: ICD-10-CM

## 2018-02-14 DIAGNOSIS — Z23 NEED FOR PROPHYLACTIC VACCINATION AND INOCULATION AGAINST INFLUENZA: ICD-10-CM

## 2018-02-14 DIAGNOSIS — M77.02 MEDIAL EPICONDYLITIS OF ELBOW, LEFT: ICD-10-CM

## 2018-02-14 DIAGNOSIS — M54.2 CHRONIC NECK PAIN: ICD-10-CM

## 2018-02-14 PROCEDURE — 90471 IMMUNIZATION ADMIN: CPT | Performed by: FAMILY MEDICINE

## 2018-02-14 PROCEDURE — 99214 OFFICE O/P EST MOD 30 MIN: CPT | Mod: 25 | Performed by: FAMILY MEDICINE

## 2018-02-14 PROCEDURE — 90686 IIV4 VACC NO PRSV 0.5 ML IM: CPT | Performed by: FAMILY MEDICINE

## 2018-02-14 RX ORDER — DULOXETIN HYDROCHLORIDE 20 MG/1
20 CAPSULE, DELAYED RELEASE ORAL DAILY
Qty: 60 CAPSULE | Refills: 1 | Status: ON HOLD | OUTPATIENT
Start: 2018-02-14 | End: 2018-03-28

## 2018-02-14 NOTE — PROGRESS NOTES

## 2018-02-14 NOTE — TELEPHONE ENCOUNTER
CD has been made for patient and placed at the  for continued care with Self  Date of images 11/2/2017 Exam MRI Cervical  REBEKAH signed and sent to scanning    Kirsten GAMBINO (R)

## 2018-02-14 NOTE — NURSING NOTE
"Chief Complaint   Patient presents with     Results     [facility] MRI     Panel Management     starr, flu       Initial /70 (BP Location: Right arm, Patient Position: Chair, Cuff Size: Adult Large)  Pulse 68  Temp 98.1  F (36.7  C) (Temporal)  Ht 5' 4.5\" (1.638 m)  Wt 199 lb (90.3 kg)  BMI 33.63 kg/m2 Estimated body mass index is 33.63 kg/(m^2) as calculated from the following:    Height as of this encounter: 5' 4.5\" (1.638 m).    Weight as of this encounter: 199 lb (90.3 kg).  Medication Reconciliation: complete   Velma Saunders CMA      "

## 2018-02-14 NOTE — PATIENT INSTRUCTIONS
"Thank you for visiting [facility]    Get your EMG as discussed.    Try the wrist splint, the elbow towel at night to help with your numbness.    Can also try elbow strap for your golfer's elbow while lifting.    Start Cymbalta to help with your pain and numbness.  We can gradually increase your dose.  Can take along with the flexeril and gabapentin if needed.    See chiropractic as discussed.      Please see me in 1-2 months for follow up. Options are       (a) office visit       (b) scheduled phone visit (check with your insurance about coverage)       (c) E-visit (need to \"request an E-visit\" through [employer])      If you had imaging scheduled please refer to your radiology prep sheet.    Appointment    Date_______________     Time_____________    Day:   M TU W TH F    With____________________________    Location_________________________    If you need medication refills, please contact your pharmacy 3 days before your prescriptions runs out. If you are out of refills, your pharmacy will contact contact the clinic.    Contact us or return if questions or concerns.     -Your Care Team:  MD Vijaya Brown PA-C Joel De Haan, PA-C Elizabeth McLean, APRN CNP    General information about your clinic      Clinic hours:     Lab hours:  Phone [phone]  Monday 7:30 am-7 pm    Monday 8:30 am-6:30 pm  Tuesday-Friday 7:30 am-5 pm   Tuesday-Friday 8:30 am-4:30 pm    Pharmacy hours:  Phone [phone]  Monday 8:30 am-7pm  Tuesday-Friday 8:30am-6 pm                                       Mychart assistance [phone]        We would like to hear from you, how was your visit today?    Gisell Chowdary  Patient Information Supervisor   Patient Care Supervisor  [place] [place], and [facility], [place], and [facility]  [phone]     "

## 2018-02-14 NOTE — MR AVS SNAPSHOT
"              After Visit Summary   2/14/2018    Zuri Reza    MRN: [id number]           Patient Information     Date Of Birth          1965        Visit Information        Provider Department      2/14/2018 9:15 AM Tom Cruz MD [facility]        Today's Diagnoses     Myalgia    -  1    Chronic neck pain        Medial epicondylitis of elbow, left        Left upper extremity numbness          Care Instructions    Thank you for visiting [facility]    Get your EMG as discussed.    Try the wrist splint, the elbow towel at night to help with your numbness.    Can also try elbow strap for your golfer's elbow while lifting.    Start Cymbalta to help with your pain and numbness.  We can gradually increase your dose.  Can take along with the flexeril and gabapentin if needed.    See chiropractic as discussed.      Please see me in 1-2 months for follow up. Options are       (a) office visit       (b) scheduled phone visit (check with your insurance about coverage)       (c) E-visit (need to \"request an E-visit\" through [employer])      If you had imaging scheduled please refer to your radiology prep sheet.    Appointment    Date_______________     Time_____________    Day:   M TU W TH F    With____________________________    Location_________________________    If you need medication refills, please contact your pharmacy 3 days before your prescriptions runs out. If you are out of refills, your pharmacy will contact contact the clinic.    Contact us or return if questions or concerns.     -Your Care Team:  MD Vijaya Brown PA-C Joel De Haan, PA-C Elizabeth McLean, PUJA CORTEZ    General information about your clinic      Clinic hours:     Lab hours:  Phone [phone]  Monday 7:30 am-7 pm    Monday 8:30 am-6:30 pm  Tuesday-Friday 7:30 am-5 pm   Tuesday-Friday 8:30 am-4:30 pm    Pharmacy hours:  Phone [phone]  Monday 8:30 " "am-7pm  Tuesday-Friday 8:30am-6 pm                                       Mychart assistance [phone]        We would like to hear from you, how was your visit today?    Gisell Chowdary  Patient Information Supervisor   Patient Care Supervisor  [facility], [facility], and [facility]  [phone]             Follow-ups after your visit        Who to contact     If you have questions or need follow up information about today's clinic visit or your schedule please contact [facility] directly at [phone].  Normal or non-critical lab and imaging results will be communicated to you by MyChart, letter or phone within 4 business days after the clinic has received the results. If you do not hear from us within 7 days, please contact the clinic through [employer] or phone. If you have a critical or abnormal lab result, we will notify you by phone as soon as possible.  Submit refill requests through [employer] or call your pharmacy and they will forward the refill request to us. Please allow 3 business days for your refill to be completed.          Additional Information About Your Visit        [employer] Information     Audio[employer]atch lets you send messages to your doctor, view your test results, renew your prescriptions, schedule appointments and more. To sign up, go to [email or web address] . Click on \"Log in\" on the left side of the screen, which will take you to the Welcome page. Then click on \"Sign up Now\" on the right side of the page.     You will be asked to enter the access code listed below, as well as some personal information. Please follow the directions to create your username and password.     Your access code is: [email or web address]  Expires: 2018  3:34 PM     Your access code will  in 90 days. If you need help or a new code, please call your [facility] or [phone].        Care EveryWhere ID     This is " "your Care EveryWhere ID. This could be used by other organizations to access your [place] medical records  [id number]        Your Vitals Were     Pulse Temperature Height BMI (Body Mass Index)          68 98.1  F (36.7  C) (Temporal) 5' 4.5\" (1.638 m) 33.63 kg/m2         Blood Pressure from Last 3 Encounters:   02/14/18 118/70   09/21/17 108/84   10/07/16 101/77    Weight from Last 3 Encounters:   02/14/18 199 lb (90.3 kg)   01/11/18 199 lb (90.3 kg)   09/21/17 194 lb 6.4 oz (88.2 kg)              Today, you had the following     No orders found for display         Today's Medication Changes          These changes are accurate as of 2/14/18 10:04 AM.  If you have any questions, ask your nurse or doctor.               Start taking these medicines.        Dose/Directions    DULoxetine 20 MG EC capsule   Commonly known as:  CYMBALTA   Used for:  Myalgia, Chronic neck pain, Left upper extremity numbness   Started by:  Tom Cruz MD        Dose:  20 mg   Take 1 capsule (20 mg) by mouth daily Can increase to twice daily for now   Quantity:  60 capsule   Refills:  1            Where to get your medicines      These medications were sent to [facility] [address] [place] [id number]     Phone:  [phone]     DULoxetine 20 MG EC capsule                Primary Care Provider Office Phone # Fax #    Tom Cruz [phone] [address] [place] [address]        Equal Access to Services     [facility]: Hadii clemente breaux hadasho Soomaali, waaxda luqadaha, qaybta kaalmadebbie alonso. So [facility] [phone].    ATENCIÓN: Si habla español, tiene a marquez disposición servicios gratuitos de asistencia lingüística. Llame al [phone].    We comply with applicable federal civil rights laws and [place] laws. We do not discriminate on the basis of race, color, national origin, age, disability, " sex, sexual orientation, or gender identity.            Thank you!     Thank you for choosing [facility]  for your care. Our goal is always to provide you with excellent care. Hearing back from our patients is one way we can continue to improve our services. Please take a few minutes to complete the written survey that you may receive in the mail after your visit with us. Thank you!             Your Updated Medication List - Protect others around you: Learn how to safely use, store and throw away your medicines at www.disposemymeds.org.          This list is accurate as of 2/14/18 10:04 AM.  Always use your most recent med list.                   Brand Name Dispense Instructions for use Diagnosis    * albuterol 108 (90 BASE) MCG/ACT Inhaler    PROAIR HFA/PROVENTIL HFA/VENTOLIN HFA    1 Inhaler    Inhale 2 puffs into the lungs every 4 hours as needed for shortness of breath / dyspnea.    Bronchitis with bronchospasm       * albuterol (2.5 MG/3ML) 0.083% neb solution     1 Box    Take 3 mLs by nebulization every 4 hours as needed for shortness of breath / dyspnea.    Cough       cyclobenzaprine 10 MG tablet    FLEXERIL    90 tablet    Take 1 tablet (10 mg) by mouth 2 times daily as needed for muscle spasms    Chronic neck pain       DULoxetine 20 MG EC capsule    CYMBALTA    60 capsule    Take 1 capsule (20 mg) by mouth daily Can increase to twice daily for now    Myalgia, Chronic neck pain, Left upper extremity numbness       fluticasone 50 MCG/ACT spray    FLONASE    1 Package    Spray 1-2 sprays into both nostrils daily    Rhinitis, non-allergic       * gabapentin 600 MG tablet    NEURONTIN    270 tablet    Take 1 tablet (600 mg) by mouth 3 times daily    Chronic neck pain       * gabapentin 300 MG capsule    NEURONTIN    270 capsule    Take 1 capsule (300 mg) by mouth 3 times daily    Chronic neck pain       ibuprofen 800 MG tablet    ADVIL/MOTRIN    270 tablet    Take 1 tablet (800 mg) by mouth  every 8 hours as needed    Chronic neck pain       * Notice:  This list has 4 medication(s) that are the same as other medications prescribed for you. Read the directions carefully, and ask your doctor or other care provider to review them with you.

## 2018-02-21 ENCOUNTER — TRANSFERRED RECORDS (OUTPATIENT)
Dept: HEALTH INFORMATION MANAGEMENT | Facility: CLINIC | Age: 53
End: 2018-02-21

## 2018-02-26 ENCOUNTER — TELEPHONE (OUTPATIENT)
Dept: FAMILY MEDICINE | Facility: OTHER | Age: 53
End: 2018-02-26

## 2018-02-26 DIAGNOSIS — G56.01 CARPAL TUNNEL SYNDROME OF RIGHT WRIST: Primary | ICD-10-CM

## 2018-02-26 DIAGNOSIS — G56.21 ULNAR NEUROPATHY AT ELBOW, RIGHT: ICD-10-CM

## 2018-02-26 NOTE — TELEPHONE ENCOUNTER
Do you have her EMG for review? They said they sent it, she had this done at [facility] in [place]. Please call today. If this does not show anything wrong, she wants to proceed today with her plan of care.

## 2018-02-26 NOTE — TELEPHONE ENCOUNTER
Spoke with patient informed her that we have not received results from her EMG that was done on 2/21/2018. [facility]#[phone], Spoke with Tanmay, she will fax us results  Kirsten GAMBINO (R)

## 2018-02-27 NOTE — TELEPHONE ENCOUNTER
EMG showed carpal tunnel syndrome on the right.  There was also an ulnar neuropathy which is consistent with cubital tunnel syndrome.  Nothing showing problems coming from the neck.  Would usually try treating carpal tunnel with wrist splints and then surgery if not responding.  Cubital tunnel syndrome can sometimes be treated with a similar approach.

## 2018-02-27 NOTE — TELEPHONE ENCOUNTER
Left message asking pt to return our call.  Please inform her of the message below from provider.  Abdelrahman Smallwood, CMA

## 2018-02-27 NOTE — TELEPHONE ENCOUNTER
"Spoke to patient and gave her message below. Patient states that wrist splints are not helping her pain. She states she is experiencing pain in the \"back of arm into elbow and all the way down\". She states \"I don't want to have surgery, but if nothing else works, then that's what I'll have to do.\" Will flag for provider to review. Please advise.   "

## 2018-02-27 NOTE — TELEPHONE ENCOUNTER
Spoke with patient informed her of message below, patient understands no further questions   Closing encounter  Kirsten Roach RT (R)

## 2018-02-28 ENCOUNTER — TELEPHONE (OUTPATIENT)
Dept: NEUROSURGERY | Facility: CLINIC | Age: 53
End: 2018-02-28

## 2018-02-28 NOTE — TELEPHONE ENCOUNTER
EMG results received and reviewed by Diana Desouza PA-C. EMG shows carpal tunnel of RUE. Diana recommends referral to ortho for evaluation and treatment. Discussed with patient. She would like to take some time to think it over, and will call back if she decides to proceed with ortho referral.

## 2018-03-01 ENCOUNTER — OFFICE VISIT (OUTPATIENT)
Dept: ORTHOPEDICS | Facility: CLINIC | Age: 53
End: 2018-03-01
Payer: COMMERCIAL

## 2018-03-01 VITALS — WEIGHT: 199 LBS | BODY MASS INDEX: 33.15 KG/M2 | TEMPERATURE: 97.1 F | HEIGHT: 65 IN

## 2018-03-01 DIAGNOSIS — G56.01 CARPAL TUNNEL SYNDROME OF RIGHT WRIST: Primary | ICD-10-CM

## 2018-03-01 DIAGNOSIS — M75.41 IMPINGEMENT SYNDROME OF SHOULDER REGION, RIGHT: ICD-10-CM

## 2018-03-01 PROCEDURE — 99243 OFF/OP CNSLTJ NEW/EST LOW 30: CPT | Mod: 25 | Performed by: ORTHOPAEDIC SURGERY

## 2018-03-01 ASSESSMENT — PAIN SCALES - GENERAL: PAINLEVEL: MODERATE PAIN (4)

## 2018-03-01 NOTE — PROGRESS NOTES
ORTHOPEDIC CLINIC CONSULT      Zuri Reza is a 52 year old female who is seen in consultation at the request of Francis Desouza.  History of Present illness:  Zuri presents for evaluation of:   1.) Right CTS    Onset:  numbness started last summer 2017    Symptoms brought on by Unknown.   Character:  burning, throbbing, numbness and tingling.    Progression of symptoms:  worse.    Previous similar pain: no .   Pain Level:  4/10.   Previous treatments:  rest/inactivity and immobilization.  Currently on Blood thinners? No  Diagnosis of Diabetes? No        Orthopedic Consult        Patient presents with:  Consult      The above note was reviewed and verified.    Although the patient is here to primarily discuss the right carpal tunnel syndrome she has she brings up that she has medial right elbow pain and diffuse shoulder discomfort.  Right elbow pain she reports is related to activity.  It is a recent phenomenon.  The right shoulder discomfort is only several days old.  It is not associated with any activity.    He has had problems with her neck for quite some time.  She relates this back to having to care for a family member who is disabled.  She eventually underwent neck surgery this is quite some time ago.  She has recently been evaluated by the spinal surgery department.  She claims that she has had a cervical MRI.  She has gotten conflicting results as to what is going on.    Prior history of related problems: See above    Patient's past medical, surgical, social and family histories reviewed.     Past Medical History:   Diagnosis Date     Chronic bronchitis      Chronic neck pain 3/18/2010     INSOMNIA NEC      MV COLLISION NOS-MCYCL PSNGR 6/13/2002    neck injury per pt     MYALGIA AND MYOSITIS NOS 1983       Past Surgical History:   Procedure Laterality Date     COLONOSCOPY N/A 10/7/2016    Procedure: COLONOSCOPY;  Surgeon: Deandre Walker MD;  Location:  GI     SURGICAL HISTORY OF -   2002    C spine  fusion C4-5       Medications:    Current Outpatient Prescriptions on File Prior to Visit:  DULoxetine (CYMBALTA) 20 MG EC capsule Take 1 capsule (20 mg) by mouth daily Can increase to twice daily for now   cyclobenzaprine (FLEXERIL) 10 MG tablet Take 1 tablet (10 mg) by mouth 2 times daily as needed for muscle spasms   ibuprofen (ADVIL/MOTRIN) 800 MG tablet Take 1 tablet (800 mg) by mouth every 8 hours as needed   gabapentin (NEURONTIN) 300 MG capsule Take 1 capsule (300 mg) by mouth 3 times daily   gabapentin (NEURONTIN) 600 MG tablet Take 1 tablet (600 mg) by mouth 3 times daily   fluticasone (FLONASE) 50 MCG/ACT nasal spray Spray 1-2 sprays into both nostrils daily   albuterol (2.5 MG/3ML) 0.083% nebulizer solution Take 3 mLs by nebulization every 4 hours as needed for shortness of breath / dyspnea.   albuterol (PROVENTIL HFA: VENTOLIN HFA) 108 (90 BASE) MCG/ACT inhaler Inhale 2 puffs into the lungs every 4 hours as needed for shortness of breath / dyspnea.     No current facility-administered medications on file prior to visit.     Allergies   Allergen Reactions     No Known Drug Allergies        Social History     Occupational History     Not on file.     Social History Main Topics     Smoking status: Former Smoker     Packs/day: 0.50     Years: 15.00     Types: Cigarettes     Quit date: 1/1/2006     Smokeless tobacco: Never Used     Alcohol use Yes      Comment: monthly      Drug use: No     Sexual activity: Not Currently     Partners: Male     Birth control/ protection: IUD       Family History   Problem Relation Age of Onset     Alcohol/Drug Father      Asthma Father      Asthma Mother        REVIEW OF SYSTEMS    General: negative for fever or fatigue    Psych:  negative for anxiety or depression     Ophthalmic:  Corrective lenses?  No    ENT:  Hearing difficulty? No    CV: negative for chest pain, venous insuffiencey     Endocrine:  negative for diabetes     Urology:  negative for kidney disease    Resp:   "Normal respiratory effort     Skin: negative for cuts/sores or redness    Musculoskeletal: as above    Neurologic:negative for numbness/tingling but there is a history of headaches.    Hematologic: negative for bleeding disorder, does not use of prescription anticoagulants         Physical Exam:    Vitals: Temp 97.1  F (36.2  C) (Temporal)  Ht 1.638 m (5' 4.5\")  Wt 90.3 kg (199 lb)  BMI 33.63 kg/m2  BMI= Body mass index is 33.63 kg/(m^2).    GENERAL APPEARANCE:  Healthy, alert, no distress    SKIN:  negative for suspicious lesions or rashes    NEURO: She generally has what appears to be normal strength and tone, mentation intact and speech normal    PSYCH:   Mentation appears Normal and affect normal/bright    RESPIRATORY: negative for respiratory distress.    EYES: negative for Conjunctivitis    Cardiovascular: no Edema                radial Present                Fingers warm and well perfused, brisk capillary refill.      GAIT: non-antalgic    JOINT/EXTREMITIES:    Upper extremity exam was performed.  Her right side was compared to the left.    She has a very poor arc of motion on the right shoulder and poor fluidity of motion.  She does not have an obvious area of point tenderness.  Rotator cuff strength testing shows weakness in arm at side external rotation and supraspinatus testing.  This is also associated with pain.    No limitations to elbow wrist or digit range of motion.    The ulnar nerve is seems to be stable within the cubital tunnel.  She has no Tinel's as we tapped over the ulnar nerve.  She does have some tenderness over the medial epicondylar region.    She describes constant tingling that occurs in the median nerve distribution including half of the ring finger.  Therefore Tinel's testing and its purest sense is negative.  Also Phalen's testing is negative.      Radiographs: X-rays of her shoulder elbow and wrist were not taken today.    It was recognized that she had a previous EMG.  Our " office staff has been searching for this result beginning 2 days ago.  We are unable to obtain them today as well.  Impression:     ICD-10-CM    1. Carpal tunnel syndrome of right wrist G56.01    2. Impingement syndrome of shoulder region, right M75.41        Patient has a clinical story consistent with right carpal tunnel syndrome.  Her medial elbow discomfort is consistent with medial epicondylitis which is probably related to carpal tunnel and hand dysfunction.    Her recent onset of shoulder discomfort is a little unusual.  She describes the onset of pain just several days ago and yet has weakness.            Plan:  The above was reviewed with Zuri    The results of the EMG would go a very long way to help sort out circumstances.  This was not obtained today.    Because of the importance of the singular test we will discharge her today to be seen in the future.    We will put her on a restriction for work based on her physical findings today and her complaints.    Finally she was explained that the EMG might need to be repeated if it was done in a limited fashion.    Referral to spine may be necessary.    Return to clinic PRTITA Cooley MD

## 2018-03-01 NOTE — LETTER
March 1, 2018      Zuri Reza  [address] [facility] [address]        To Whom It May Concern:    Zuri Reza was seen in our clinic today 3/1/2018. She may return to work with the following: limited to light duty - lifting no greater than 5 pounds, no repetitive motions and no  duties.  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  She may return to work on or about 3/1/2018.      Sincerely,        Davonte Cooley MD

## 2018-03-01 NOTE — NURSING NOTE
"Chief Complaint   Patient presents with     Consult       Initial Temp 97.1  F (36.2  C) (Temporal)  Ht 1.638 m (5' 4.5\")  Wt 90.3 kg (199 lb)  BMI 33.63 kg/m2 Estimated body mass index is 33.63 kg/(m^2) as calculated from the following:    Height as of this encounter: 1.638 m (5' 4.5\").    Weight as of this encounter: 90.3 kg (199 lb).  Medication Reconciliation: complete   TOM Velasco  "

## 2018-03-01 NOTE — LETTER
3/1/2018         RE: Zuri Reza  [address]  [facility] [address]        Dear Colleague,    Thank you for referring your patient, Zuri Reza, to the [facility]. Please see a copy of my visit note below.    ORTHOPEDIC CLINIC CONSULT      Zuri Reza is a 52 year old female who is seen in consultation at the request of Francis Desouza.  History of Present illness:  Zuri presents for evaluation of:   1.) Right CTS    Onset:  numbness started last summer 2017    Symptoms brought on by Unknown.   Character:  burning, throbbing, numbness and tingling.    Progression of symptoms:  worse.    Previous similar pain: no .   Pain Level:  4/10.   Previous treatments:  rest/inactivity and immobilization.  Currently on Blood thinners? No  Diagnosis of Diabetes? No        Orthopedic Consult        Patient presents with:  Consult      The above note was reviewed and verified.    Although the patient is here to primarily discuss the right carpal tunnel syndrome she has she brings up that she has medial right elbow pain and diffuse shoulder discomfort.  Right elbow pain she reports is related to activity.  It is a recent phenomenon.  The right shoulder discomfort is only several days old.  It is not associated with any activity.    He has had problems with her neck for quite some time.  She relates this back to having to care for a family member who is disabled.  She eventually underwent neck surgery this is quite some time ago.  She has recently been evaluated by the spinal surgery department.  She claims that she has had a cervical MRI.  She has gotten conflicting results as to what is going on.    Prior history of related problems: See above    Patient's past medical, surgical, social and family histories reviewed.     Past Medical History:   Diagnosis Date     Chronic bronchitis      Chronic neck pain 3/18/2010     INSOMNIA NEC      MV COLLISION NOS-MCYCL PSNGR 6/13/2002    neck injury per  pt     MYALGIA AND MYOSITIS NOS 1983       Past Surgical History:   Procedure Laterality Date     COLONOSCOPY N/A 10/7/2016    Procedure: COLONOSCOPY;  Surgeon: Deandre Walker MD;  Location:  GI     SURGICAL HISTORY OF -   2002    C spine fusion C4-5       Medications:    Current Outpatient Prescriptions on File Prior to Visit:  DULoxetine (CYMBALTA) 20 MG EC capsule Take 1 capsule (20 mg) by mouth daily Can increase to twice daily for now   cyclobenzaprine (FLEXERIL) 10 MG tablet Take 1 tablet (10 mg) by mouth 2 times daily as needed for muscle spasms   ibuprofen (ADVIL/MOTRIN) 800 MG tablet Take 1 tablet (800 mg) by mouth every 8 hours as needed   gabapentin (NEURONTIN) 300 MG capsule Take 1 capsule (300 mg) by mouth 3 times daily   gabapentin (NEURONTIN) 600 MG tablet Take 1 tablet (600 mg) by mouth 3 times daily   fluticasone (FLONASE) 50 MCG/ACT nasal spray Spray 1-2 sprays into both nostrils daily   albuterol (2.5 MG/3ML) 0.083% nebulizer solution Take 3 mLs by nebulization every 4 hours as needed for shortness of breath / dyspnea.   albuterol (PROVENTIL HFA: VENTOLIN HFA) 108 (90 BASE) MCG/ACT inhaler Inhale 2 puffs into the lungs every 4 hours as needed for shortness of breath / dyspnea.     No current facility-administered medications on file prior to visit.     Allergies   Allergen Reactions     No Known Drug Allergies        Social History     Occupational History     Not on file.     Social History Main Topics     Smoking status: Former Smoker     Packs/day: 0.50     Years: 15.00     Types: Cigarettes     Quit date: 1/1/2006     Smokeless tobacco: Never Used     Alcohol use Yes      Comment: monthly      Drug use: No     Sexual activity: Not Currently     Partners: Male     Birth control/ protection: IUD       Family History   Problem Relation Age of Onset     Alcohol/Drug Father      Asthma Father      Asthma Mother        REVIEW OF SYSTEMS    General: negative for fever or fatigue    Psych:   "negative for anxiety or depression     Ophthalmic:  Corrective lenses?  No    ENT:  Hearing difficulty? No    CV: negative for chest pain, venous insuffiencey     Endocrine:  negative for diabetes     Urology:  negative for kidney disease    Resp:  Normal respiratory effort     Skin: negative for cuts/sores or redness    Musculoskeletal: as above    Neurologic:negative for numbness/tingling but there is a history of headaches.    Hematologic: negative for bleeding disorder, does not use of prescription anticoagulants         Physical Exam:    Vitals: Temp 97.1  F (36.2  C) (Temporal)  Ht 1.638 m (5' 4.5\")  Wt 90.3 kg (199 lb)  BMI 33.63 kg/m2  BMI= Body mass index is 33.63 kg/(m^2).    GENERAL APPEARANCE:  Healthy, alert, no distress    SKIN:  negative for suspicious lesions or rashes    NEURO: She generally has what appears to be normal strength and tone, mentation intact and speech normal    PSYCH:   Mentation appears Normal and affect normal/bright    RESPIRATORY: negative for respiratory distress.    EYES: negative for Conjunctivitis    Cardiovascular: no Edema                radial Present                Fingers warm and well perfused, brisk capillary refill.      GAIT: non-antalgic    JOINT/EXTREMITIES:    Upper extremity exam was performed.  Her right side was compared to the left.    She has a very poor arc of motion on the right shoulder and poor fluidity of motion.  She does not have an obvious area of point tenderness.  Rotator cuff strength testing shows weakness in arm at side external rotation and supraspinatus testing.  This is also associated with pain.    No limitations to elbow wrist or digit range of motion.    The ulnar nerve is seems to be stable within the cubital tunnel.  She has no Tinel's as we tapped over the ulnar nerve.  She does have some tenderness over the medial epicondylar region.    She describes constant tingling that occurs in the median nerve distribution including half of the " ring finger.  Therefore Tinel's testing and its purest sense is negative.  Also Phalen's testing is negative.      Radiographs: X-rays of her shoulder elbow and wrist were not taken today.    It was recognized that she had a previous EMG.  Our office staff has been searching for this result beginning 2 days ago.  We are unable to obtain them today as well.  Impression:     ICD-10-CM    1. Carpal tunnel syndrome of right wrist G56.01    2. Impingement syndrome of shoulder region, right M75.41        Patient has a clinical story consistent with right carpal tunnel syndrome.  Her medial elbow discomfort is consistent with medial epicondylitis which is probably related to carpal tunnel and hand dysfunction.    Her recent onset of shoulder discomfort is a little unusual.  She describes the onset of pain just several days ago and yet has weakness.            Plan:  The above was reviewed with Zuri    The results of the EMG would go a very long way to help sort out circumstances.  This was not obtained today.    Because of the importance of the singular test we will discharge her today to be seen in the future.    We will put her on a restriction for work based on her physical findings today and her complaints.    Finally she was explained that the EMG might need to be repeated if it was done in a limited fashion.    Referral to spine may be necessary.    Return to clinic PRN    Davonte Cooley MD            Again, thank you for allowing me to participate in the care of your patient.        Sincerely,        Davonte Cooley MD

## 2018-03-01 NOTE — MR AVS SNAPSHOT
"              After Visit Summary   3/1/2018    Zuri Reza    MRN: [id number]           Patient Information     Date Of Birth          1965        Visit Information        Provider Department      3/1/2018 10:10 AM Davonte Cooley MD [facility]         Follow-ups after your visit        Your next 10 appointments already scheduled     Mar 01, 2018 10:10 AM CST   New Visit with Davonte Cooley MD   [facility]    [address] [place] [id number] [phone]              Who to contact     If you have questions or need follow up information about today's clinic visit or your schedule please contact [facility] directly at [phone].  Normal or non-critical lab and imaging results will be communicated to you by MyChart, letter or phone within 4 business days after the clinic has received the results. If you do not hear from us within 7 days, please contact the clinic through MyChart or phone. If you have a critical or abnormal lab result, we will notify you by phone as soon as possible.  Submit refill requests through [employer] or call your pharmacy and they will forward the refill request to us. Please allow 3 business days for your refill to be completed.          Additional Information About Your Visit        [employer] Information     [employer] lets you send messages to your doctor, view your test results, renew your prescriptions, schedule appointments and more. To sign up, go to [email or web address] . Click on \"Log in\" on the left side of the screen, which will take you to the Welcome page. Then click on \"Sign up Now\" on the right side of the page.     You will be asked to enter the access code listed below, as well as some personal information. Please follow the directions to create your username and password.     Your access code is: [email or web address]  Expires: 4/11/2018  3:34 PM     Your access code will " " in 90 days. If you need help or a new code, please call your [facility] clinic or [phone].        Care EveryWhere ID     This is your Care EveryWhere ID. This could be used by other organizations to access your [facility] medical records  [id number]        Your Vitals Were     Temperature Height BMI (Body Mass Index)             97.1  F (36.2  C) (Temporal) 1.638 m (5' 4.5\") 33.63 kg/m2          Blood Pressure from Last 3 Encounters:   18 118/70   17 108/84   10/07/16 101/77    Weight from Last 3 Encounters:   18 90.3 kg (199 lb)   18 90.3 kg (199 lb)   18 90.3 kg (199 lb)              Today, you had the following     No orders found for display       Primary Care Provider Office Phone # Fax #    Tom Cruz [phone] [address] [place] [address]        Equal Access to Services     [facility]: Hadii clemente ku hadasho Soomaali, waaxda luqadaha, qaybta kaalmada adeegyada, debbie buckley . So [facility] [phone].    ATENCIÓN: Si habla español, tiene a marquez disposición servicios gratuitos de asistencia lingüística. Llame al [phone].    We comply with applicable federal civil rights laws and [place] laws. We do not discriminate on the basis of race, color, national origin, age, disability, sex, sexual orientation, or gender identity.            Thank you!     Thank you for choosing [facility]  for your care. Our goal is always to provide you with excellent care. Hearing back from our patients is one way we can continue to improve our services. Please take a few minutes to complete the written survey that you may receive in the mail after your visit with us. Thank you!             Your Updated Medication List - Protect others around you: Learn how to safely use, store and throw away your medicines at www.disposemymeds.org.          This list is accurate as of 3/1/18 10:09 AM.  Always use your " most recent med list.                   Brand Name Dispense Instructions for use Diagnosis    * albuterol 108 (90 BASE) MCG/ACT Inhaler    PROAIR HFA/PROVENTIL HFA/VENTOLIN HFA    1 Inhaler    Inhale 2 puffs into the lungs every 4 hours as needed for shortness of breath / dyspnea.    Bronchitis with bronchospasm       * albuterol (2.5 MG/3ML) 0.083% neb solution     1 Box    Take 3 mLs by nebulization every 4 hours as needed for shortness of breath / dyspnea.    Cough       cyclobenzaprine 10 MG tablet    FLEXERIL    90 tablet    Take 1 tablet (10 mg) by mouth 2 times daily as needed for muscle spasms    Chronic neck pain       DULoxetine 20 MG EC capsule    CYMBALTA    60 capsule    Take 1 capsule (20 mg) by mouth daily Can increase to twice daily for now    Myalgia, Chronic neck pain, Left upper extremity numbness       fluticasone 50 MCG/ACT spray    FLONASE    1 Package    Spray 1-2 sprays into both nostrils daily    Rhinitis, non-allergic       * gabapentin 600 MG tablet    NEURONTIN    270 tablet    Take 1 tablet (600 mg) by mouth 3 times daily    Chronic neck pain       * gabapentin 300 MG capsule    NEURONTIN    270 capsule    Take 1 capsule (300 mg) by mouth 3 times daily    Chronic neck pain       ibuprofen 800 MG tablet    ADVIL/MOTRIN    270 tablet    Take 1 tablet (800 mg) by mouth every 8 hours as needed    Chronic neck pain       * Notice:  This list has 4 medication(s) that are the same as other medications prescribed for you. Read the directions carefully, and ask your doctor or other care provider to review them with you.

## 2018-03-05 ENCOUNTER — TELEPHONE (OUTPATIENT)
Dept: ORTHOPEDICS | Facility: CLINIC | Age: 53
End: 2018-03-05

## 2018-03-05 NOTE — TELEPHONE ENCOUNTER
A letter was written on 3/1/18 with a 5 pound weight restriction and other restrictions.  Please print and send.  We will check on the EMG results and get back to patient tomorrow if they are in.

## 2018-03-05 NOTE — TELEPHONE ENCOUNTER
Reason for Call:  Other call back    Detailed comments: Patient needs a note for work today with weight restriction.  Please call asap.  She is also asking if the results from her EMG have been received yet.      Phone Number Patient can be reached at: Home number on file [phone] (home)    Best Time: any    Can we leave a detailed message on this number? YES    Call taken on 3/5/2018 at 2:22 PM by Keturah Rodríguez

## 2018-03-05 NOTE — TELEPHONE ENCOUNTER
I spoke with pt. She said the 5lb weight restriction her work place will not accept. It need to be at lease 10lbs or they will require her to take a leave. Pt is aware that MD is gone for the day and we will take care of this in the am.  Also she wants to know what happened to the EMG results. She would like to know if the test was done correctly or not. I am not sure where the results are. I said I would work on this in the am.  Also needs to know if she needs another EMG and another appt?  Her letter can be faxed to [phone], timoteo Yancey. KAMLA Williamson

## 2018-03-06 NOTE — TELEPHONE ENCOUNTER
The EMG report has been located. Pt will be seen tomorrow am.   Per Dr. Cooley pt may increase her weight restriction for work to 10lbs. Pt is aware. A new letter was composed and faxed to number below. KAMLA Williamson

## 2018-03-07 ENCOUNTER — OFFICE VISIT (OUTPATIENT)
Dept: ORTHOPEDICS | Facility: CLINIC | Age: 53
End: 2018-03-07
Payer: COMMERCIAL

## 2018-03-07 VITALS — WEIGHT: 199 LBS | BODY MASS INDEX: 33.15 KG/M2 | HEIGHT: 65 IN | TEMPERATURE: 98.4 F

## 2018-03-07 DIAGNOSIS — F41.8 SITUATIONAL ANXIETY: ICD-10-CM

## 2018-03-07 DIAGNOSIS — G56.21 LESION OF RIGHT ULNAR NERVE: ICD-10-CM

## 2018-03-07 DIAGNOSIS — G56.01 CARPAL TUNNEL SYNDROME OF RIGHT WRIST: Primary | ICD-10-CM

## 2018-03-07 DIAGNOSIS — M77.01 GOLFER'S ELBOW, RIGHT: ICD-10-CM

## 2018-03-07 PROCEDURE — 99213 OFFICE O/P EST LOW 20 MIN: CPT | Performed by: ORTHOPAEDIC SURGERY

## 2018-03-07 RX ORDER — DIAZEPAM 5 MG
5 TABLET ORAL
Qty: 10 TABLET | Refills: 0 | Status: ON HOLD | OUTPATIENT
Start: 2018-03-07 | End: 2018-03-28

## 2018-03-07 ASSESSMENT — PAIN SCALES - GENERAL: PAINLEVEL: MODERATE PAIN (4)

## 2018-03-07 NOTE — PROGRESS NOTES
"HISTORY OF PRESENT ILLNESS:    Zuri Reza is a 52 year old female who is seen in follow up for   Chief Complaint   Patient presents with     RECHECK     Right Carpal Tunnel Syndrome.  Review EMG.   Present symptoms: continued numbness and pain from right hand and elbow that can extend to the lateral shoulder at times.  Continued neck pain.   Treatments tried to this point: EMG testing  Patient evaluation done with Dr. Cooley    Physical Exam:  Vitals: Temp 98.4  F (36.9  C) (Temporal)  Ht 1.638 m (5' 4.5\")  Wt 90.3 kg (199 lb)  BMI 33.63 kg/m2  BMI= Body mass index is 33.63 kg/(m^2).  Constitutional: healthy, alert and no acute distress   Psychiatric: mentation appears normal and affect normal/bright  NEURO: no focal deficits  SKIN: no excoriation or erythema. No signs of infection.  JOINT/EXTREMITIES:  Affected extremity pulses are easily palpable.  Right Wrist Exam: WRIST:positive tinels  Inspection: no visible atrophy  Range of Motion: maintains rom of flexion/extension  Strength: decreased second to pain/numbness    Right ELBOW:  Sensitive over medial epicondyle.  Nerve not easily palpated or moved.     EMG: moderate to severe carpal tunnel, decreased signal ulnar nerve involvement at the right elbow.  No lesions identified cervical.     ASSESSMENT:    Chief Complaint   Patient presents with     RECHECK     Right Carpal Tunnel Syndrome.  Review EMG.       ICD-10-CM    1. Carpal tunnel syndrome of right wrist G56.01    2. Situational anxiety F41.8 diazepam (VALIUM) 5 MG tablet   3. 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  Patient with persistent numbness and tingling on a daily basis of the right hand and wrist secondary to EMG confirmed moderate to severe carpal tunnel    Plan:   Discussion was made regarding patient's positive findings for " carpal tunnel as well as positive findings ulnar nerve compression.  Patient is advised that with nighttime splinting techniques the ulnar nerve symptoms could dissipate/reverse.  She should address the carpal tunnel symptoms.  We will maintain patient's current work restrictions of no lifting greater than 10 pounds.  Patient is advised that postoperatively of addressing right carpal tunnel release she would likely miss 4-6 weeks off work as a .  Nighttime splinting techniques were discussed with the patient.  Doing the carpal tunnel first in relieving those symptoms would be better able to identify symptoms related to ulnar nerve compression.  Patient was given a Valium prescription at today's visit to determine how many tablets he would take for her to be calm but not sedated prior to her procedure.  She will take the recommended number of tablets within an hour of carpal tunnel release procedure.  Her mother can pick her up from surgery.  Patient will need to verify with work and her finances as to when this can be accomplished to address surgically.  She will call the clinic when she works this out.  In regards to her neck MRI, patient is advised to make visit with neurosurgery clinic to address his cervical spinal issues.  She has seen Francis Desouza in the past but would like a different provider to get another vantage point.  Return to clinic when ready for scheduling carpal tunnel release of the right side    Scribed by  Nikky Reed PA-C   3/7/2018  10:38 AM      I attest I have seen and evaluated the patient.  I agree with above impression and plan.    Davonte Cooley MD

## 2018-03-07 NOTE — MR AVS SNAPSHOT
"              After Visit Summary   3/7/2018    Zuri Reza    MRN: [id number]           Patient Information     Date Of Birth          1965        Visit Information        Provider Department      3/7/2018 10:20 AM Davonte Cooley MD [facility]         Follow-ups after your visit        Your next 10 appointments already scheduled     Mar 07, 2018 10:20 AM CST   [facility] Visit with [facility]oley MD   [facility]    [address] [place] [address] [id number] [phone]              Who to contact     If you have questions or need follow up information about today's clinic visit or your schedule please contact [facility] directly at [phone].  Normal or non-critical lab and imaging results will be communicated to you by MyChart, letter or phone within 4 business days after the clinic has received the results. If you do not hear from us within 7 days, please contact the clinic through MyChart or phone. If you have a critical or abnormal lab result, we will notify you by phone as soon as possible.  Submit refill requests through [employer] or call your pharmacy and they will forward the refill request to us. Please allow 3 business days for your refill to be completed.          Additional Information About Your Visit        [employer] Information     [employer] lets you send messages to your doctor, view your test results, renew your prescriptions, schedule appointments and more. To sign up, go to [email or web address] . Click on \"Log in\" on the left side of the screen, which will take you to the Welcome page. Then click on \"Sign up Now\" on the right side of the page.     You will be asked to enter the access code listed below, as well as some personal information. Please follow the directions to create your username and password.     Your access code is: [email or web address]  Expires: 4/11/2018  3:34 PM     Your access code will " " in 90 days. If you need help or a new code, please call your [place] [facility] clinic or [phone].        Care EveryWhere ID     This is your Care EveryWhere ID. This could be used by other organizations to access your [place] [facility] medical records  [id number]        Your Vitals Were     Temperature Height BMI (Body Mass Index)             98.4  F (36.9  C) (Temporal) 1.638 m (5' 4.5\") 33.63 kg/m2          Blood Pressure from Last 3 Encounters:   18 118/70   17 108/84   10/07/16 101/77    Weight from Last 3 Encounters:   18 90.3 kg (199 lb)   18 90.3 kg (199 lb)   18 90.3 kg (199 lb)              Today, you had the following     No orders found for display       Primary Care Provider Office Phone # Fax #    Tom Cruz [phone] [address] [place] [address]        Equal Access to Services     [facility]: Hadii clemente ku hadasho Soomaali, waaxda luqadaha, qaybta kaalmada adeegyada, debbie buckley . So [facility] [phone].    ATENCIÓN: Si habla español, tiene a marqeuz disposición servicios gratuitos de asistencia lingüística. Llame al [phone].    We comply with applicable federal civil rights laws and [place] laws. We do not discriminate on the basis of race, color, national origin, age, disability, sex, sexual orientation, or gender identity.            Thank you!     Thank you for choosing [facility]  for your care. Our goal is always to provide you with excellent care. Hearing back from our patients is one way we can continue to improve our services. Please take a few minutes to complete the written survey that you may receive in the mail after your visit with us. Thank you!             Your Updated Medication List - Protect others around you: Learn how to safely use, store and throw away your medicines at www.disposemymeds.org.          This list is accurate as of 3/7/18 10:05 AM.  Always use your " most recent med list.                   Brand Name Dispense Instructions for use Diagnosis    * albuterol 108 (90 BASE) MCG/ACT Inhaler    PROAIR HFA/PROVENTIL HFA/VENTOLIN HFA    1 Inhaler    Inhale 2 puffs into the lungs every 4 hours as needed for shortness of breath / dyspnea.    Bronchitis with bronchospasm       * albuterol (2.5 MG/3ML) 0.083% neb solution     1 Box    Take 3 mLs by nebulization every 4 hours as needed for shortness of breath / dyspnea.    Cough       cyclobenzaprine 10 MG tablet    FLEXERIL    90 tablet    Take 1 tablet (10 mg) by mouth 2 times daily as needed for muscle spasms    Chronic neck pain       DULoxetine 20 MG EC capsule    CYMBALTA    60 capsule    Take 1 capsule (20 mg) by mouth daily Can increase to twice daily for now    Myalgia, Chronic neck pain, Left upper extremity numbness       fluticasone 50 MCG/ACT spray    FLONASE    1 Package    Spray 1-2 sprays into both nostrils daily    Rhinitis, non-allergic       * gabapentin 600 MG tablet    NEURONTIN    270 tablet    Take 1 tablet (600 mg) by mouth 3 times daily    Chronic neck pain       * gabapentin 300 MG capsule    NEURONTIN    270 capsule    Take 1 capsule (300 mg) by mouth 3 times daily    Chronic neck pain       ibuprofen 800 MG tablet    ADVIL/MOTRIN    270 tablet    Take 1 tablet (800 mg) by mouth every 8 hours as needed    Chronic neck pain       * Notice:  This list has 4 medication(s) that are the same as other medications prescribed for you. Read the directions carefully, and ask your doctor or other care provider to review them with you.

## 2018-03-07 NOTE — NURSING NOTE
"Chief Complaint   Patient presents with     RECHECK     Right Carpal Tunnel Syndrome.  Review EMG.       Initial Temp 98.4  F (36.9  C) (Temporal)  Ht 1.638 m (5' 4.5\")  Wt 90.3 kg (199 lb)  BMI 33.63 kg/m2 Estimated body mass index is 33.63 kg/(m^2) as calculated from the following:    Height as of this encounter: 1.638 m (5' 4.5\").    Weight as of this encounter: 90.3 kg (199 lb).  Medication Reconciliation: complete   TOM Velasco  "

## 2018-03-07 NOTE — LETTER
"    3/7/2018         RE: Zuri Reza  [address] [facility] [address]        Dear Colleague,    Thank you for referring your patient, Zuri Reza, to the [facility]. Please see a copy of my visit note below.    HISTORY OF PRESENT ILLNESS:    Zuri Reza is a 52 year old female who is seen in follow up for   Chief Complaint   Patient presents with     RECHECK     Right Carpal Tunnel Syndrome.  Review EMG.   Present symptoms: continued numbness and pain from right hand and elbow that can extend to the lateral shoulder at times.  Continued neck pain.   Treatments tried to this point: EMG testing  Patient evaluation done with Dr. Cooley    Physical Exam:  Vitals: Temp 98.4  F (36.9  C) (Temporal)  Ht 1.638 m (5' 4.5\")  Wt 90.3 kg (199 lb)  BMI 33.63 kg/m2  BMI= Body mass index is 33.63 kg/(m^2).  Constitutional: healthy, alert and no acute distress   Psychiatric: mentation appears normal and affect normal/bright  NEURO: no focal deficits  SKIN: no excoriation or erythema. No signs of infection.  JOINT/EXTREMITIES:  Affected extremity pulses are easily palpable.  Right Wrist Exam: WRIST:positive tinels  Inspection: no visible atrophy  Range of Motion: maintains rom of flexion/extension  Strength: decreased second to pain/numbness    Right ELBOW:  Sensitive over medial epicondyle.  Nerve not easily palpated or moved.     EMG: moderate to severe carpal tunnel, decreased signal ulnar nerve involvement at the right elbow.  No lesions identified cervical.     ASSESSMENT:    Chief Complaint   Patient presents with     RECHECK     Right Carpal Tunnel Syndrome.  Review EMG.       ICD-10-CM    1. Carpal tunnel syndrome of right wrist G56.01    2. Situational anxiety F41.8 diazepam (VALIUM) 5 MG tablet   3. 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  Patient with persistent numbness and tingling on a daily basis of the right hand and wrist secondary to EMG confirmed moderate to severe carpal tunnel    Plan:   Discussion was made regarding patient's positive findings for carpal tunnel as well as positive findings ulnar nerve compression.  Patient is advised that with nighttime splinting techniques the ulnar nerve symptoms could dissipate/reverse.  She should address the carpal tunnel symptoms.  We will maintain patient's current work restrictions of no lifting greater than 10 pounds.  Patient is advised that postoperatively of addressing right carpal tunnel release she would likely miss 4-6 weeks off work as a .  Nighttime splinting techniques were discussed with the patient.  Doing the carpal tunnel first in relieving those symptoms would be better able to identify symptoms related to ulnar nerve compression.  Patient was given a Valium prescription at today's visit to determine how many tablets he would take for her to be calm but not sedated prior to her procedure.  She will take the recommended number of tablets within an hour of carpal tunnel release procedure.  Her mother can pick her up from surgery.  Patient will need to verify with work and her finances as to when this can be accomplished to address surgically.  She will call the clinic when she works this out.  In regards to her neck MRI, patient is advised to make visit with neurosurgery clinic to address his cervical spinal issues.  She has seen Francis Desouza in the past but would like a different provider to get another vantage point.  Return to clinic when ready for scheduling carpal tunnel release of the right side    Scribed by  Nikky Reed PA-C   3/7/2018  10:38 AM      I attest I have seen and evaluated the patient.  I agree with above impression and plan.    Davonte Cooley MD    Again, thank you for allowing me to participate in the care of your patient.         Sincerely,        Davonte Cooley MD

## 2018-03-13 ENCOUNTER — TELEPHONE (OUTPATIENT)
Dept: ORTHOPEDICS | Facility: CLINIC | Age: 53
End: 2018-03-13

## 2018-03-13 NOTE — TELEPHONE ENCOUNTER
Type of surgery: Right carpal tunnel release  Location of surgery: [facility]  Date and time of surgery: 3/28/18  Surgeon: Dr. Cooley  Pre-Op Appt Date: NA  Post-Op Appt Date: 4/5/18   Packet sent out: Yes  Pre-cert/Authorization completed:  Not Applicable  Date:

## 2018-03-28 ENCOUNTER — TELEPHONE (OUTPATIENT)
Dept: ORTHOPEDICS | Facility: CLINIC | Age: 53
End: 2018-03-28

## 2018-03-28 ENCOUNTER — SURGERY (OUTPATIENT)
Age: 53
End: 2018-03-28

## 2018-03-28 ENCOUNTER — HOSPITAL ENCOUNTER (OUTPATIENT)
Facility: CLINIC | Age: 53
Discharge: HOME | End: 2018-03-28
Attending: ORTHOPAEDIC SURGERY | Admitting: ORTHOPAEDIC SURGERY
Payer: COMMERCIAL

## 2018-03-28 DIAGNOSIS — Z98.890 S/P CARPAL TUNNEL RELEASE: Primary | ICD-10-CM

## 2018-03-28 PROCEDURE — 25000125 ZZHC RX 250: Performed by: ORTHOPAEDIC SURGERY

## 2018-03-28 PROCEDURE — 40000306 ZZH STATISTIC PRE PROC ASSESS II: Performed by: ORTHOPAEDIC SURGERY

## 2018-03-28 PROCEDURE — 27210794 ZZH OR GENERAL SUPPLY STERILE: Performed by: ORTHOPAEDIC SURGERY

## 2018-03-28 PROCEDURE — 71000027 ZZH RECOVERY PHASE 2 EACH 15 MINS: Performed by: ORTHOPAEDIC SURGERY

## 2018-03-28 PROCEDURE — 25000128 H RX IP 250 OP 636: Performed by: ORTHOPAEDIC SURGERY

## 2018-03-28 PROCEDURE — 36000050 ZZH SURGERY LEVEL 2 1ST 30 MIN: Performed by: ORTHOPAEDIC SURGERY

## 2018-03-28 PROCEDURE — 64721 CARPAL TUNNEL SURGERY: CPT | Mod: RT | Performed by: ORTHOPAEDIC SURGERY

## 2018-03-28 PROCEDURE — 36000052 ZZH SURGERY LEVEL 2 EA 15 ADDTL MIN: Performed by: ORTHOPAEDIC SURGERY

## 2018-03-28 RX ORDER — ACETAMINOPHEN 325 MG/1
650 TABLET ORAL
Status: DISCONTINUED | OUTPATIENT
Start: 2018-03-28 | End: 2018-03-28 | Stop reason: HOSPADM

## 2018-03-28 RX ORDER — LIDOCAINE HYDROCHLORIDE 10 MG/ML
INJECTION, SOLUTION INFILTRATION; PERINEURAL PRN
Status: DISCONTINUED | OUTPATIENT
Start: 2018-03-28 | End: 2018-03-28 | Stop reason: HOSPADM

## 2018-03-28 RX ORDER — ETHYL CHLORIDE 100 %
AEROSOL, SPRAY (ML) TOPICAL PRN
Status: DISCONTINUED | OUTPATIENT
Start: 2018-03-28 | End: 2018-03-28 | Stop reason: HOSPADM

## 2018-03-28 RX ORDER — SENNOSIDES A AND B 8.6 MG/1
1 TABLET, FILM COATED ORAL 2 TIMES DAILY PRN
Qty: 40 TABLET | Refills: 0 | Status: SHIPPED | OUTPATIENT
Start: 2018-03-28 | End: 2018-05-10

## 2018-03-28 RX ORDER — LIDOCAINE 40 MG/G
CREAM TOPICAL
Status: DISCONTINUED | OUTPATIENT
Start: 2018-03-28 | End: 2018-03-28 | Stop reason: HOSPADM

## 2018-03-28 RX ORDER — OXYCODONE HYDROCHLORIDE 5 MG/1
5 TABLET ORAL
Status: DISCONTINUED | OUTPATIENT
Start: 2018-03-28 | End: 2018-03-28 | Stop reason: HOSPADM

## 2018-03-28 RX ORDER — OXYCODONE HYDROCHLORIDE 5 MG/1
5-10 TABLET ORAL EVERY 4 HOURS PRN
Qty: 30 TABLET | Refills: 0 | Status: SHIPPED | OUTPATIENT
Start: 2018-03-28 | End: 2018-05-10

## 2018-03-28 RX ORDER — BUPIVACAINE HYDROCHLORIDE 5 MG/ML
INJECTION, SOLUTION PERINEURAL PRN
Status: DISCONTINUED | OUTPATIENT
Start: 2018-03-28 | End: 2018-03-28 | Stop reason: HOSPADM

## 2018-03-28 RX ADMIN — Medication 1 SPRAY: at 12:05

## 2018-03-28 RX ADMIN — BUPIVACAINE HYDROCHLORIDE 7.5 ML: 5 INJECTION, SOLUTION PERINEURAL at 12:09

## 2018-03-28 RX ADMIN — LIDOCAINE HYDROCHLORIDE 7.5 ML: 10 INJECTION, SOLUTION INFILTRATION; PERINEURAL at 12:10

## 2018-03-28 RX ADMIN — MIDAZOLAM 2 MG: 1 INJECTION INTRAMUSCULAR; INTRAVENOUS at 12:17

## 2018-03-28 NOTE — TELEPHONE ENCOUNTER
Our goal is to have forms completed with 72 hours, however some forms may require a visit or additional information.    Who is the form from?: Insurance comp  Where the form came from: Patient or family brought in     What clinic location was the form placed at?: [facility]  Where the form was placed: 'heidi Andujar  What number is listed as a contact on the form?: [phone]    Phone call message- patient request for a letter, form or note:    Date needed: at your convenience  Please fax to [phone]  Has the patient signed a consent form for release of information? YES    Additional comments:    Call taken on 3/28/2018 at 9:24 AM by Keturah Rodríguez    Type of letter, form or note: [place]

## 2018-03-28 NOTE — OP NOTE
OPERATIVE REPORT    DATE OF SERVICE:  3/28/2018      PREOPERATIVE DIAGNOSIS  RIGHT carpal tunnel syndrome.      POSTOPERATIVE DIAGNOSIS  RIGHT carpal tunnel syndrome.      NAME OF OPERATION  RIGHT carpal tunnel release.     SURGEON  Davonte Cooley MD     ANESTHESIA: Local/Valium? 1 mg Versed    ESTIMATED BLOOD LOSS: less than 20cc    ANTIBIOTICS: none    FINDINGS: tenosynovitis.    Tourniquet time: 4 minutes at 250 mmHg.    Complications: None apparent.      INDICATIONS  Zuri Reza is a 52 year old female with RIGHT carpal tunnel syndrome, confirmed by EMG. The symptoms have been quite bothersome.  Conservative treatment has failed, including night splints, therapy, NSAIDs . Risks of surgery, including but not limited to: bleeding, infection, pain, scar, damage to adjacent structures (nerves, vessels), temporary vs permanent nerve damage, failure to relieve symptoms, recurrence of symptoms, need for further surgery, risks of anesthesia, and death were discussed. All questions were addressed to patient satisfaction. The patient elected to proceed with this surgery understanding the risks and perceived benefits. Informed consent was obtained for the procedure.       PROCEDURE IN DETAIL  The patient was identified in the preoperative holding area. The correct procedure and procedural site was confirmed with the patient. Consent was reviewed. The correct surgical site was marked with an indelible marker by the attending surgeon, Davonte Cooley MD.  The patient was then taken to the operating room and placed on the operating table in the supine position.  Tourniquet was placed around the proximal arm. All bony prominences well padded. The limb was prepped and draped in the usual sterile fashion.   Local anesthetic using a combination of 0.25% Marcaine and 1% lidocaine was injected in the anticipated incision site.  Patient was now given 1 mg of versed due to anxiety.The limb was exsanguinated and tourniquet inflated  to 250 mmHg.    A curvilinear incision was made in the usual location from the mid palmar region to the flexor crease of the wrist and then angle ulnar wise another centimeter.  The incision was taken down through the skin and subcutaneous tissue carefully to the level of the palmar fascia.   A self retaining retractor was placed.    The palmar fascia was then carefully incised, and the transverse carpal ligament was then exposed. The distal edge of the ligament was identified and a small hemostat was placed underneath and to the ulnar side of the canal.  The transverse carpal ligament was then incised from distal to proximal under direct vision while protecting underlying structures with the hemostat.    Proximally, we protected the superficial structures using a raggnel , and then completed the ligament incision using a tenotomy scissors under direct visualization.  The distal volar forearm fascia was also incised longitudinally for a segment of approximately 3cm so that the little finger of the surgeon could be passed into the distal forearm indicating the complete release of the median nerve.   The wound was copiously irrigated with normal saline and the tourniquet deflated.  Hemostasis was achieved with pressure and electrocautery.      The nerve was now inspected.     The wound was re irrigated.  Skin closure was accomplished with 3-0 nylon using a vertical.  Dressings of xeroform, sterile 4X4's and sterile Webril were applied followed by a 3 inch coban.     The patient was then transferred to the hospital cart and to the recovery area in stable condition. There were no apparent complications. She was very comfortable with the anesthesia.    Postop plan will be non-wt bearing of right upper extremity.  Oral pain medications will be provided. Elevation of right upper extremity at all times to reduce swelling.  Dressing to be left in place until seen in the office in 10 days.  Call the office for any dressing  concerns.    Davonte Cooley MD

## 2018-03-28 NOTE — IP AVS SNAPSHOT
[facility]    [address]     [place] [address]    Phone:  [phone]                                       After Visit Summary   3/28/2018    Zuri Reza    MRN: [id number]           After Visit Summary Signature Page     I have received my discharge instructions, and my questions have been answered. I have discussed any challenges I see with this plan with the nurse or doctor.    ..........................................................................................................................................  Patient/Patient Representative Signature      ..........................................................................................................................................  Patient Representative Print Name and Relationship to Patient    ..................................................               ................................................  Date                                            Time    ..........................................................................................................................................  Reviewed by Signature/Title    ...................................................              ..............................................  Date                                                            Time

## 2018-03-29 VITALS
OXYGEN SATURATION: 92 % | TEMPERATURE: 98 F | SYSTOLIC BLOOD PRESSURE: 100 MMHG | RESPIRATION RATE: 10 BRPM | DIASTOLIC BLOOD PRESSURE: 75 MMHG

## 2018-04-05 ENCOUNTER — OFFICE VISIT (OUTPATIENT)
Dept: ORTHOPEDICS | Facility: CLINIC | Age: 53
End: 2018-04-05
Payer: COMMERCIAL

## 2018-04-05 VITALS — TEMPERATURE: 97.4 F | SYSTOLIC BLOOD PRESSURE: 100 MMHG | DIASTOLIC BLOOD PRESSURE: 66 MMHG

## 2018-04-05 DIAGNOSIS — G56.01 CARPAL TUNNEL SYNDROME OF RIGHT WRIST: Primary | ICD-10-CM

## 2018-04-05 DIAGNOSIS — Z48.89 POSTOPERATIVE VISIT: ICD-10-CM

## 2018-04-05 PROCEDURE — 99024 POSTOP FOLLOW-UP VISIT: CPT | Performed by: ORTHOPAEDIC SURGERY

## 2018-04-05 ASSESSMENT — PAIN SCALES - GENERAL: PAINLEVEL: MODERATE PAIN (4)

## 2018-04-05 NOTE — PROGRESS NOTES
HISTORY OF PRESENT ILLNESS:    Zuri Reza is a 52 year old female who is seen in follow up for   Chief Complaint   Patient presents with     RECHECK     Right carpal tunnel release.  DOS: 3/28/18 (7 days postop)     Surgical Followup     PREOPERATIVE DIAGNOSIS: RIGHT carpal tunnel syndrome. POSTOPERATIVE DIAGNOSIS: RIGHT carpal tunnel syndrome. NAME OF OPERATION: RIGHT carpal tunnel release.     Present symptoms: Patient reports some improvement in her hand symptoms.  She denies that there is been a significant improvement in her arm symptoms.  Treatments tried to this point: Postop visit    PHYSICAL EXAM:  /66 (BP Location: Left arm, Patient Position: Chair, Cuff Size: Adult Regular)  Temp 97.4  F (36.3  C) (Temporal)  There is no height or weight on file to calculate BMI.       Physical Exam:  Vitals: /66 (BP Location: Left arm, Patient Position: Chair, Cuff Size: Adult Regular)  Temp 97.4  F (36.3  C) (Temporal)  BMI= There is no height or weight on file to calculate BMI.  Constitutional: healthy, alert and no acute distress   Psychiatric: mentation appears normal and affect normal/bright    JOINT/EXTREMITIES:  NEURO: no focal deficits  Affected extremity pulses are easily palpable.  SKIN: no excoriation or erythema. No signs of infection.    Sutures were removed .            Swelling: Minimal and very appropriate.  Minimal  Bruising: And very appropriate  ROM: Slightly limited with wrist and digit flexion extension.      IMAGING INTERPRETATION: None taken     ASSESSMENT:    ICD-10-CM    1. Carpal tunnel syndrome of right wrist G56.01    2. Postoperative visit Z48.89        Doing as expected    PLAN:      Edema control reviewed.  Home activities were reviewed.  Return to Work: I will not allow her to return to work at this time she is a .      Return to clinic 3, weeks    Davonte Cooley MD

## 2018-04-05 NOTE — LETTER
4/5/2018         RE: Zuri Reza  [address]  [facility] [address]        Dear Colleague,    Thank you for referring your patient, Zuri Reza, to the [facility]. Please see a copy of my visit note below.    HISTORY OF PRESENT ILLNESS:    Zuri Reza is a 52 year old female who is seen in follow up for   Chief Complaint   Patient presents with     RECHECK     Right carpal tunnel release.  DOS: 3/28/18 (7 days postop)     Surgical Followup     PREOPERATIVE DIAGNOSIS: RIGHT carpal tunnel syndrome. POSTOPERATIVE DIAGNOSIS: RIGHT carpal tunnel syndrome. NAME OF OPERATION: RIGHT carpal tunnel release.     Present symptoms: Patient reports some improvement in her hand symptoms.  She denies that there is been a significant improvement in her arm symptoms.  Treatments tried to this point: Postop visit    PHYSICAL EXAM:  /66 (BP Location: Left arm, Patient Position: Chair, Cuff Size: Adult Regular)  Temp 97.4  F (36.3  C) (Temporal)  There is no height or weight on file to calculate BMI.       Physical Exam:  Vitals: /66 (BP Location: Left arm, Patient Position: Chair, Cuff Size: Adult Regular)  Temp 97.4  F (36.3  C) (Temporal)  BMI= There is no height or weight on file to calculate BMI.  Constitutional: healthy, alert and no acute distress   Psychiatric: mentation appears normal and affect normal/bright    JOINT/EXTREMITIES:  NEURO: no focal deficits  Affected extremity pulses are easily palpable.  SKIN: no excoriation or erythema. No signs of infection.    Sutures were removed .            Swelling: Minimal and very appropriate.  Minimal  Bruising: And very appropriate  ROM: Slightly limited with wrist and digit flexion extension.      IMAGING INTERPRETATION: None taken     ASSESSMENT:    ICD-10-CM    1. Carpal tunnel syndrome of right wrist G56.01    2. Postoperative visit Z48.89        Doing as expected    PLAN:      Edema control reviewed.  Home activities were  reviewed.  Return to Work: I will not allow her to return to work at this time she is a .      Return to clinic 3, weeks    Davonte Cooley MD            Again, thank you for allowing me to participate in the care of your patient.        Sincerely,        Davonte Cooley MD

## 2018-04-05 NOTE — MR AVS SNAPSHOT
"              After Visit Summary   2018    Zuri Reza    MRN: [id number]           Patient Information     Date Of Birth          1965        Visit Information        Provider Department      2018 9:30 AM Davonte Cooley MD [facility]        Today's Diagnoses     Carpal tunnel syndrome of right wrist    -  1    Postoperative visit           Follow-ups after your visit        Who to contact     If you have questions or need follow up information about today's clinic visit or your schedule please contact [facility] directly at [phone].  Normal or non-critical lab and imaging results will be communicated to you by Papertonhart, letter or phone within 4 business days after the clinic has received the results. If you do not hear from us within 7 days, please contact the clinic through Tr[employer] or phone. If you have a critical or abnormal lab result, we will notify you by phone as soon as possible.  Submit refill requests through [employer] or call your pharmacy and they will forward the refill request to us. Please allow 3 business days for your refill to be completed.          Additional Information About Your Visit        MyChart Information     [employer] lets you send messages to your doctor, view your test results, renew your prescriptions, schedule appointments and more. To sign up, go to [email or web address] . Click on \"Log in\" on the left side of the screen, which will take you to the Welcome page. Then click on \"Sign up Now\" on the right side of the page.     You will be asked to enter the access code listed below, as well as some personal information. Please follow the directions to create your username and password.     Your access code is: [email or web address]  Expires: 2018  4:34 PM     Your access code will  in 90 days. If you need help or a new code, please call your [facility] or [phone].        Care EveryWhere ID     This is your Care " EveryWhere ID. This could be used by other organizations to access your [facility] medical records  [id number]        Your Vitals Were     Temperature                   97.4  F (36.3  C) (Temporal)            Blood Pressure from Last 3 Encounters:   04/05/18 100/66   03/28/18 100/75   02/14/18 118/70    Weight from Last 3 Encounters:   03/07/18 90.3 kg (199 lb)   03/01/18 90.3 kg (199 lb)   02/14/18 90.3 kg (199 lb)              Today, you had the following     No orders found for display       Primary Care Provider Office Phone # Fax #    Tom Cruz [phone] [address] [place] [address]        Equal Access to Services     [facility] ALEXEI : Hadii aad ku hadasho Soomaali, waaxda luqadaha, qaybta kaalmada adeegyada, waxaman buckley . So [facility] [phone].    ATENCIÓN: Si habla español, tiene a marquez disposición servicios gratuitos de asistencia lingüística. [facility] [phone].    We comply with applicable federal civil rights laws and [place] laws. We do not discriminate on the basis of race, color, national origin, age, disability, sex, sexual orientation, or gender identity.            Thank you!     Thank you for choosing [facility]  for your care. Our goal is always to provide you with excellent care. Hearing back from our patients is one way we can continue to improve our services. Please take a few minutes to complete the written survey that you may receive in the mail after your visit with us. Thank you!             Your Updated Medication List - Protect others around you: Learn how to safely use, store and throw away your medicines at www.disposemymeds.org.          This list is accurate as of 4/5/18 10:24 AM.  Always use your most recent med list.                   Brand Name Dispense Instructions for use Diagnosis    * albuterol 108 (90 BASE) MCG/ACT Inhaler    PROAIR HFA/PROVENTIL HFA/VENTOLIN HFA    1 Inhaler    Inhale 2  puffs into the lungs every 4 hours as needed for shortness of breath / dyspnea.    Bronchitis with bronchospasm       * albuterol (2.5 MG/3ML) 0.083% neb solution     1 Box    Take 3 mLs by nebulization every 4 hours as needed for shortness of breath / dyspnea.    Cough       cyclobenzaprine 10 MG tablet    FLEXERIL    90 tablet    Take 1 tablet (10 mg) by mouth 2 times daily as needed for muscle spasms    Chronic neck pain       fluticasone 50 MCG/ACT spray    FLONASE    1 Package    Spray 1-2 sprays into both nostrils daily    Rhinitis, non-allergic       gabapentin 600 MG tablet    NEURONTIN    270 tablet    Take 1 tablet (600 mg) by mouth 3 times daily    Chronic neck pain       ibuprofen 800 MG tablet    ADVIL/MOTRIN    270 tablet    Take 1 tablet (800 mg) by mouth every 8 hours as needed    Chronic neck pain       oxyCODONE IR 5 MG tablet    ROXICODONE    30 tablet    Take 1-2 tablets (5-10 mg) by mouth every 4 hours as needed for pain or other (Moderate to Severe)    S/P carpal tunnel release       senna 8.6 MG tablet    SENOKOT    40 tablet    Take 1 tablet by mouth 2 times daily as needed for constipation    S/P carpal tunnel release       * Notice:  This list has 2 medication(s) that are the same as other medications prescribed for you. Read the directions carefully, and ask your doctor or other care provider to review them with you.

## 2018-04-05 NOTE — NURSING NOTE
"Chief Complaint   Patient presents with     RECHECK     Right carpal tunnel release.  DOS: 3/28/18 (7 days postop)     Surgical Followup     PREOPERATIVE DIAGNOSIS: RIGHT carpal tunnel syndrome. POSTOPERATIVE DIAGNOSIS: RIGHT carpal tunnel syndrome. NAME OF OPERATION: RIGHT carpal tunnel release.       Initial /66 (BP Location: Left arm, Patient Position: Chair, Cuff Size: Adult Regular)  Temp 97.4  F (36.3  C) (Temporal) Estimated body mass index is 33.63 kg/(m^2) as calculated from the following:    Height as of 3/7/18: 1.638 m (5' 4.5\").    Weight as of 3/7/18: 90.3 kg (199 lb).  Medication Reconciliation: complete   TOM Velasco  "

## 2018-05-10 ENCOUNTER — OFFICE VISIT (OUTPATIENT)
Dept: ORTHOPEDICS | Facility: CLINIC | Age: 53
End: 2018-05-10
Payer: COMMERCIAL

## 2018-05-10 VITALS
OXYGEN SATURATION: 93 % | DIASTOLIC BLOOD PRESSURE: 84 MMHG | HEIGHT: 65 IN | SYSTOLIC BLOOD PRESSURE: 111 MMHG | HEART RATE: 88 BPM

## 2018-05-10 DIAGNOSIS — Z98.890 S/P CARPAL TUNNEL RELEASE: Primary | ICD-10-CM

## 2018-05-10 PROCEDURE — 99024 POSTOP FOLLOW-UP VISIT: CPT | Performed by: ORTHOPAEDIC SURGERY

## 2018-05-10 ASSESSMENT — PAIN SCALES - GENERAL: PAINLEVEL: NO PAIN (0)

## 2018-05-10 NOTE — MR AVS SNAPSHOT
"              After Visit Summary   5/10/2018    Zuri Reza    MRN: [id number]           Patient Information     Date Of Birth          1965        Visit Information        Provider Department      5/10/2018 9:30 AM Davonte Cooley MD [facility]         Follow-ups after your visit        Who to contact     If you have questions or need follow up information about today's clinic visit or your schedule please contact [facility] directly at [phone].  Normal or non-critical lab and imaging results will be communicated to you by MyChart, letter or phone within 4 business days after the clinic has received the results. If you do not hear from us within 7 days, please contact the clinic through [employer] or phone. If you have a critical or abnormal lab result, we will notify you by phone as soon as possible.  Submit refill requests through [employer] or call your pharmacy and they will forward the refill request to us. Please allow 3 business days for your refill to be completed.          Additional Information About Your Visit        [facility] Information     [employer] lets you send messages to your doctor, view your test results, renew your prescriptions, schedule appointments and more. To sign up, go to [email or web address] . Click on \"Log in\" on the left side of the screen, which will take you to the Welcome page. Then click on \"Sign up Now\" on the right side of the page.     You will be asked to enter the access code listed below, as well as some personal information. Please follow the directions to create your username and password.     Your access code is: [email or web address]  Expires: 2018  9:36 AM     Your access code will  in 90 days. If you need help or a new code, please call your [facility] or [phone].        Care EveryWhere ID     This is your Care EveryWhere ID. This could be used by other organizations to access your [facility] medical " "records  [id number]        Your Vitals Were     Pulse Height Pulse Oximetry             88 1.638 m (5' 4.5\") 93%          Blood Pressure from Last 3 Encounters:   05/10/18 111/84   04/05/18 100/66   03/28/18 100/75    Weight from Last 3 Encounters:   03/07/18 90.3 kg (199 lb)   03/01/18 90.3 kg (199 lb)   02/14/18 90.3 kg (199 lb)              Today, you had the following     No orders found for display       Primary Care Provider Office Phone # Fax #    Tom Daryl Cruz [phone] [address] [place] [address]        Equal Access to Services     [facility]: Hadii clemente breaux hadasho Sorasheed, waaxda luqadaha, qaybta kaalmada adeegyada, debbie buckley . So [facility] [phone].    ATENCIÓN: Si habla español, tiene a marquez disposición servicios gratuitos de asistencia lingüística. [facility] [phone].    We comply with applicable federal civil rights laws and [place] laws. We do not discriminate on the basis of race, color, national origin, age, disability, sex, sexual orientation, or gender identity.            Thank you!     Thank you for choosing [facility]  for your care. Our goal is always to provide you with excellent care. Hearing back from our patients is one way we can continue to improve our services. Please take a few minutes to complete the written survey that you may receive in the mail after your visit with us. Thank you!             Your Updated Medication List - Protect others around you: Learn how to safely use, store and throw away your medicines at www.disposemymeds.org.          This list is accurate as of 5/10/18  9:36 AM.  Always use your most recent med list.                   Brand Name Dispense Instructions for use Diagnosis    * albuterol 108 (90 Base) MCG/ACT Inhaler    PROAIR HFA/PROVENTIL HFA/VENTOLIN HFA    1 Inhaler    Inhale 2 puffs into the lungs every 4 hours as needed for shortness of breath / dyspnea.    " Bronchitis with bronchospasm       * albuterol (2.5 MG/3ML) 0.083% neb solution     1 Box    Take 3 mLs by nebulization every 4 hours as needed for shortness of breath / dyspnea.    Cough       cyclobenzaprine 10 MG tablet    FLEXERIL    90 tablet    Take 1 tablet (10 mg) by mouth 2 times daily as needed for muscle spasms    Chronic neck pain       fluticasone 50 MCG/ACT spray    FLONASE    1 Package    Spray 1-2 sprays into both nostrils daily    Rhinitis, non-allergic       gabapentin 600 MG tablet    NEURONTIN    270 tablet    Take 1 tablet (600 mg) by mouth 3 times daily    Chronic neck pain       ibuprofen 800 MG tablet    ADVIL/MOTRIN    270 tablet    Take 1 tablet (800 mg) by mouth every 8 hours as needed    Chronic neck pain       * Notice:  This list has 2 medication(s) that are the same as other medications prescribed for you. Read the directions carefully, and ask your doctor or other care provider to review them with you.

## 2018-05-10 NOTE — LETTER
"    5/10/2018         RE: Zuri Reza  [address] [facility] [address]        Dear Colleague,    Thank you for referring your patient, Zuri Reza, to the [facility]. Please see a copy of my visit note below.    HISTORY OF PRESENT ILLNESS:    Zuri Reza is a 52 year old female who is seen in follow up for   Chief Complaint   Patient presents with     RECHECK     Right carpal tunnel release.  DOS: 3/28/18 (6 weeks postop)     Surgical Followup     PREOPERATIVE DIAGNOSIS: RIGHT carpal tunnel syndrome. POSTOPERATIVE DIAGNOSIS: RIGHT carpal tunnel syndrome. NAME OF OPERATION: RIGHT carpal tunnel release.     Returns to clinic.   States numbness and tingling from prior to surgery in the hand has resolved completely. Notes some tenderness and discomfort at incision site, has been favoring the hand, notes doing fine other than turning handle.  Originally return visit was for return to work note.  Patient notes no longer employed.  She is more concerned about 30+ year history of \"neck and neck nerve\" pain.  States is seeing her PCP who recently ordered an MRI and based off that recommended chiropractor, PT, pain management.  Per patient she has the name and referal for pain management.   Treatments tried to this point: activity avoidance  Present symptoms: some incisional tenderness with activity especially with twisting and turning handles.       PHYSICAL EXAM:  /84 (BP Location: Right arm, Patient Position: Chair, Cuff Size: Adult Large)  Pulse 88  Ht 1.638 m (5' 4.5\")  SpO2 93%  There is no height or weight on file to calculate BMI.       Physical Exam:  Vitals: /84 (BP Location: Right arm, Patient Position: Chair, Cuff Size: Adult Large)  Pulse 88  Ht 1.638 m (5' 4.5\")  SpO2 93%  BMI= There is no height or weight on file to calculate BMI.  Constitutional: healthy, alert and no acute distress   Psychiatric: mentation appears normal and affect " normal/bright    JOINT/EXTREMITIES:  NEURO: no focal deficits  Affected extremity pulses are easily palpable.  SKIN: no excoriation or erythema. No signs of infection. Surgical wound is healthy.  Right wrist: sensation intact.  ROM full intact to wrist and fingers. Able to make a fist.  Mild tenderness with deep palpation to incision site.  No swelling to wrist or hand.    GAIT: non-antalgic    IMAGING INTERPRETATION:  None taken today     ASSESSMENT:    ICD-10-CM    1. S/P carpal tunnel release Z98.890    Recovering as expected.  Had resolution of symptoms.      PLAN:    Edema control reviewed.    Physical Therapy/Activity:  As tolerated. Continue to use wrist. Do not favor wrist anymore.      Return to Work:  No contraindication from an orthopedic perspective for full return to duty.  Recommended follow up with PCP regarding neck and recommended contacting Pain clinic for ongoing neck issues. Declined writing a letter for patient stating her neck is preventing her from working.  She would need this through her PCP, neurosurgery, spine, or likely a functional capacity exam.     Return to clinic PRN    Scribed by:  Tanmay Hardy, APRN, CNP, DNP  10:10 AM  5/10/2018    I attest I have seen and evaluated the patient.  I agree with above impression and plan.  Davonte Cooley MD                Again, thank you for allowing me to participate in the care of your patient.        Sincerely,        Davonte Cooley MD

## 2018-05-10 NOTE — PROGRESS NOTES
"HISTORY OF PRESENT ILLNESS:    Zuri Reza is a 52 year old female who is seen in follow up for   Chief Complaint   Patient presents with     RECHECK     Right carpal tunnel release.  DOS: 3/28/18 (6 weeks postop)     Surgical Followup     PREOPERATIVE DIAGNOSIS: RIGHT carpal tunnel syndrome. POSTOPERATIVE DIAGNOSIS: RIGHT carpal tunnel syndrome. NAME OF OPERATION: RIGHT carpal tunnel release.     Returns to clinic.   States numbness and tingling from prior to surgery in the hand has resolved completely. Notes some tenderness and discomfort at incision site, has been favoring the hand, notes doing fine other than turning handle.  Originally return visit was for return to work note.  Patient notes no longer employed.  She is more concerned about 30+ year history of \"neck and neck nerve\" pain.  States is seeing her PCP who recently ordered an MRI and based off that recommended chiropractor, PT, pain management.  Per patient she has the name and referal for pain management.   Treatments tried to this point: activity avoidance  Present symptoms: some incisional tenderness with activity especially with twisting and turning handles.       PHYSICAL EXAM:  /84 (BP Location: Right arm, Patient Position: Chair, Cuff Size: Adult Large)  Pulse 88  Ht 1.638 m (5' 4.5\")  SpO2 93%  There is no height or weight on file to calculate BMI.       Physical Exam:  Vitals: /84 (BP Location: Right arm, Patient Position: Chair, Cuff Size: Adult Large)  Pulse 88  Ht 1.638 m (5' 4.5\")  SpO2 93%  BMI= There is no height or weight on file to calculate BMI.  Constitutional: healthy, alert and no acute distress   Psychiatric: mentation appears normal and affect normal/bright    JOINT/EXTREMITIES:  NEURO: no focal deficits  Affected extremity pulses are easily palpable.  SKIN: no excoriation or erythema. No signs of infection. Surgical wound is healthy.  Right wrist: sensation intact.  ROM full intact to wrist and fingers. " Able to make a fist.  Mild tenderness with deep palpation to incision site.  No swelling to wrist or hand.    GAIT: non-antalgic    IMAGING INTERPRETATION:  None taken today     ASSESSMENT:    ICD-10-CM    1. S/P carpal tunnel release Z98.890    Recovering as expected.  Had resolution of symptoms.      PLAN:    Edema control reviewed.    Physical Therapy/Activity:  As tolerated. Continue to use wrist. Do not favor wrist anymore.      Return to Work:  No contraindication from an orthopedic perspective for full return to duty.  Recommended follow up with PCP regarding neck and recommended contacting Pain clinic for ongoing neck issues. Declined writing a letter for patient stating her neck is preventing her from working.  She would need this through her PCP, neurosurgery, spine, or likely a functional capacity exam.     Return to clinic PRN    Scribed by:  PUJA Frias, CNP, DNP  10:10 AM  5/10/2018    I attest I have seen and evaluated the patient.  I agree with above impression and plan.  Davonte Cooley MD

## 2018-05-24 ENCOUNTER — TELEPHONE (OUTPATIENT)
Dept: FAMILY MEDICINE | Facility: OTHER | Age: 53
End: 2018-05-24

## 2018-05-24 NOTE — TELEPHONE ENCOUNTER
Spoke with patient informed her that there is a referral already placed in her chart from Dr Hannah's PA, per request from patient referral has been mailed to her, she states she is looking at her options with different pain clinics, I informed her if she needs additional help/records from us to please give us a call.  Patient understands and has no further questions at this time   Closing encounter  Kirsten Roach RT (R)

## 2018-05-24 NOTE — TELEPHONE ENCOUNTER
Reason for Call:  Other referral     Detailed comments: Pt calling, asking if she needs an appt for a referral for a pain clinic or if Dr. Cruz can just place that for her. Please advise.     Phone Number Patient can be reached at: Home number on file [phone] (home)    Best Time: any     Can we leave a detailed message on this number? YES    Call taken on 5/24/2018 at 9:41 AM by Rukhsana Roberson

## 2018-06-01 ENCOUNTER — HEALTH MAINTENANCE LETTER (OUTPATIENT)
Age: 53
End: 2018-06-01

## 2018-06-01 ENCOUNTER — TELEPHONE (OUTPATIENT)
Dept: FAMILY MEDICINE | Facility: OTHER | Age: 53
End: 2018-06-01

## 2018-06-01 NOTE — TELEPHONE ENCOUNTER
Reason for Call:  Other    Detailed comments: Wants to change referral ([facility]) [phone] and fax is: [phone]    Phone Number Patient can be reached at: Home number on file [phone] (home)    Best Time: any    Can we leave a detailed message on this number? YES    Call taken on 6/1/2018 at 8:18 AM by Ivy Mcnamara

## 2018-06-01 NOTE — TELEPHONE ENCOUNTER
Referral has been faxed   Left message for patient stating message below  Closing encounter  Kirsten Roach RT (R)

## 2018-06-01 NOTE — TELEPHONE ENCOUNTER
I didn't put in the original pain management referral.  Kiersten Desouza did.  I don't have a problem with either referral.  Pt was supposed to follow up with me a few months ago and has not.

## 2018-06-01 NOTE — LETTER
[facility]  [address] [place] [id number] [phone]          June 25, 2018    Zuri Reza                                                                                                                     [address] [place] [address]            Dear Zuri,    Rere you will find a Release of Information form that we will need you to sign so we can send your medical records to [facility].     Please sign and return to us in the enclosed envelope.     Sincerely,         Your [facility] Care Team

## 2018-06-06 NOTE — TELEPHONE ENCOUNTER
Pt calling states they have received referral but they need reynolds to send over documentation office notes stating pt has chronic neck pain. Please advise and [facility]. Contact pt once completed at phone [phone]

## 2018-06-06 NOTE — TELEPHONE ENCOUNTER
I called [facility] to see what records they need from us, they need 6 months of medical records I gave them our Medical records ph# for them to call and request.  I have notified patient of this, she understands and states no further questions at this time  Closing encounter  Kirsten Roach RT (R)

## 2018-06-25 NOTE — TELEPHONE ENCOUNTER
Spoke with [facility], informed them that they need to call our medical records department ph#[phone] to request medical records, the representative states she does not see that they have been requested she is going to check with their medical records person to see if this has been requested.   Patient has been informed and I have sent her an REBEKAH to sign and send back.   Post-poning for a week  Thanks  Kirsten Roach RT (R)

## 2018-06-25 NOTE — TELEPHONE ENCOUNTER
Patient called clinic back stating [facility] stated they only received the referral and not the records. Patient also said she was told from someone at [facility] that records were sent. I told patient I would have to send her to medical records department. Patient asked why, and I told her because they would be the ones to have sent the records.

## 2018-07-03 NOTE — TELEPHONE ENCOUNTER
LMTC, please ask patient if she has received REBEKAH to sign and if she has returned it to us yet?  Thanks  Kirsten Roach RT (R)       Spoke with [facility], they are still waiting for the last 6 months of office visits from us, we are waiting for signed REBEKAH

## 2018-07-12 NOTE — TELEPHONE ENCOUNTER
Flag for --I am post poning to follow up that this has been done    REBEKAH has been received and scanned in.  I called and spoke with Jason at HIM (medical records [facility]) to verify that records have been sent to patient, he stated that we should be the ones to fax since we have the same access to the records, I stated that pain clinic is wanting to get the last year of records he states that we should be faxing this request.  I verified this with Gisell Amaral she states that this should not be done by us but by our HIM's department, she is following up with management.  I have called [facility] to have them contact HIM to request records, they will do this today.   Post Poning to verify that this has been taken care of  Thanks   Kirsten Roach RT (R)

## 2018-07-19 NOTE — TELEPHONE ENCOUNTER
Left message for [facility] to verify that they have received medical records, she will have their medical records department look into this and call us back  Kirsten Roach RT (R)

## 2018-08-29 NOTE — PATIENT INSTRUCTIONS
You might check with your medical marijuana specialist to see if a high-CBD oil would be more helpful for you.    I think it would be helpful to see a spine specialist for a possible neck injection.    Physical therapy is also recommended.    Can see rheumatology to get their opinion too.    Come back in a few weeks for your fasting labs.    Contact us or return if questions or concerns.     Have a nice day!    Dr. Cruz       Preventive Health Recommendations  Female Ages 50 - 64    Yearly exam: See your health care provider every year in order to  o Review health changes.   o Discuss preventive care.    o Review your medicines if your doctor has prescribed any.      Get a Pap test every three years (unless you have an abnormal result and your provider advises testing more often).    If you get Pap tests with HPV test, you only need to test every 5 years, unless you have an abnormal result.     You do not need a Pap test if your uterus was removed (hysterectomy) and you have not had cancer.    You should be tested each year for STDs (sexually transmitted diseases) if you're at risk.     Have a mammogram every 1 to 2 years.    Have a colonoscopy at age 50, or have a yearly FIT test (stool test). These exams screen for colon cancer.      Have a cholesterol test every 5 years, or more often if advised.    Have a diabetes test (fasting glucose) every three years. If you are at risk for diabetes, you should have this test more often.     If you are at risk for osteoporosis (brittle bone disease), think about having a bone density scan (DEXA).    Shots: Get a flu shot each year. Get a tetanus shot every 10 years.    Nutrition:     Eat at least 5 servings of fruits and vegetables each day.    Eat whole-grain bread, whole-wheat pasta and brown rice instead of white grains and rice.    Get adequate Calcium and Vitamin D.     Lifestyle    Exercise at least 150 minutes a week (30 minutes a day, 5 days a week). This will help  you control your weight and prevent disease.    Limit alcohol to one drink per day.    No smoking.     Wear sunscreen to prevent skin cancer.     See your dentist every six months for an exam and cleaning.    See your eye doctor every 1 to 2 years.

## 2018-08-29 NOTE — PROGRESS NOTES
"   SUBJECTIVE:   CC: Zuri Reza is an 52 year old woman who presents for preventive health visit.     Concerns today: Pain in neck and check for diabetes  Her neck is still bothering her quite a bit.    Has a new job, cleaning for a company.  Was previously 4 hrs/day, now is 5 hrs/day.  This is making it hard for her to do the job.  After 4 hours, gets too tired and increased neck pain.    She went to the [facility].  Has been certified for medical marijuana.  Doesn't really like it.  Makes her feel \"stoned\".  Thinks it helps slightly, but can't use while working.      She has retained a  who recommended she be seen by a rheumatologist to get a diagnosis of fibromyalgia.    Physical   Annual:     Getting at least 3 servings of Calcium per day:  Yes    Bi-annual eye exam:  Yes    Dental care twice a year:  Yes    Sleep apnea or symptoms of sleep apnea:  None    Diet:  Low salt    Frequency of exercise:  2-3 days/week    Duration of exercise:  15-30 minutes    Taking medications regularly:  Yes    Medication side effects:  None    Additional concerns today:  YES    Isn't taking gabapentin during the day, but it does help her at night.  Also takes flexeril at night.  Does get some change in her sensorium from gabapentin, so doesn't take it if she has to take drive or work.  Took Lyrica in the past with some benefit, wondering about resuming this.    Takes ibuprofen while at work.        The 10-year ASCVD risk score (Success CHARLETTE Jr, et al., 2013) is: 0.8%    Values used to calculate the score:      Age: 52 years      Sex: Female      Is Non- : No      Diabetic: No      Tobacco smoker: No      Systolic Blood Pressure: 100 mmHg      Is BP treated: No      HDL Cholesterol: 60 mg/dL      Total Cholesterol: 177 mg/dL  Patient is eligible for use of low-dose aspirin for primary prevention of heart attack and stroke.  Provider has discussed aspirin with patient and our decision " was:     Contraindicated:  Daily low-dose aspirin for primary prevention contraindicated, provider does not recommend.        Today's PHQ-2 Score:   PHQ-2 ( 1999 Pfizer) 9/5/2018   Q1: Little interest or pleasure in doing things 3   Q2: Feeling down, depressed or hopeless 0   PHQ-2 Score 3   Q1: Little interest or pleasure in doing things Nearly every day   Q2: Feeling down, depressed or hopeless Not at all   PHQ-2 Score 3       Abuse: Current or Past(Physical, Sexual or Emotional)- No  Do you feel safe in your environment - Yes    Social History   Substance Use Topics     Smoking status: Former Smoker     Packs/day: 0.50     Years: 15.00     Types: Cigarettes     Quit date: 1/1/2006     Smokeless tobacco: Never Used     Alcohol use Yes      Comment: maybe once a month     Alcohol Use 9/5/2018   If you drink alcohol do you typically have greater than 3 drinks per day OR greater than 7 drinks per week? No       Reviewed orders with patient.  Reviewed health maintenance and updated orders accordingly - Yes  BP Readings from Last 3 Encounters:   09/05/18 100/70   05/10/18 111/84   04/05/18 100/66    Wt Readings from Last 3 Encounters:   09/05/18 194 lb 14.4 oz (88.4 kg)   03/07/18 199 lb (90.3 kg)   03/01/18 199 lb (90.3 kg)                  Patient Active Problem List   Diagnosis     ESOPHAGEAL REFLUX     Myalgia     Insomnia     Headache     Contraceptive management     Chronic rhinitis     Surveillance of previously prescribed intrauterine contraceptive device     Chronic neck pain     CARDIOVASCULAR SCREENING; LDL GOAL LESS THAN 160     Carpal tunnel syndrome of right wrist     Golfer's elbow, right     Lesion of right ulnar nerve     S/P carpal tunnel release     Past Surgical History:   Procedure Laterality Date     COLONOSCOPY N/A 10/7/2016    Procedure: COLONOSCOPY;  Surgeon: Deandre Walker MD;  Location: [facility]     RELEASE CARPAL TUNNEL Right 3/28/2018    Procedure: RELEASE CARPAL TUNNEL;  right carpal tunnel  release;  Surgeon: Davonte Cooley MD;  Location: [facility]     SURGICAL HISTORY OF -   2002    C spine fusion C4-5       Social History   Substance Use Topics     Smoking status: Former Smoker     Packs/day: 0.50     Years: 15.00     Types: Cigarettes     Quit date: 1/1/2006     Smokeless tobacco: Never Used     Alcohol use Yes      Comment: maybe once a month     Family History   Problem Relation Age of Onset     Alcohol/Drug Father      Asthma Father      Asthma Mother          Current Outpatient Prescriptions   Medication Sig Dispense Refill     albuterol (2.5 MG/3ML) 0.083% nebulizer solution Take 3 mLs by nebulization every 4 hours as needed for shortness of breath / dyspnea. 1 Box 0     albuterol (PROVENTIL HFA: VENTOLIN HFA) 108 (90 BASE) MCG/ACT inhaler Inhale 2 puffs into the lungs every 4 hours as needed for shortness of breath / dyspnea. 1 Inhaler 3     cyclobenzaprine (FLEXERIL) 10 MG tablet Take 1 tablet (10 mg) by mouth 2 times daily as needed for muscle spasms 90 tablet 3     fluticasone (FLONASE) 50 MCG/ACT nasal spray Spray 1-2 sprays into both nostrils daily 1 Package 11     gabapentin (NEURONTIN) 600 MG tablet Take 1 tablet (600 mg) by mouth 3 times daily 270 tablet 3     ibuprofen (ADVIL/MOTRIN) 800 MG tablet Take 1 tablet (800 mg) by mouth every 8 hours as needed 270 tablet 1     Allergies   Allergen Reactions     No Known Drug Allergies      Recent Labs   Lab Test  08/24/16   1415  12/14/10   1024   LDL  92  122   HDL  60  52   TRIG  124  88   ALT  30   --    CR  0.95  0.76   GFRESTIMATED  62  82   GFRESTBLACK  75  >90   POTASSIUM  3.9  4.5        Patient over age 50, mutual decision to screen reflected in health maintenance.    Pertinent mammograms are reviewed under the imaging tab.  History of abnormal Pap smear: NO - age 30-65 PAP every 5 years with negative HPV co-testing recommended  PAP / HPV 4/19/2011 10/13/2006   PAP NIL NIL     Reviewed and updated as needed this visit by clinical  staff  Tobacco  Allergies  Med Hx  Surg Hx  Fam Hx  Soc Hx        Reviewed and updated as needed this visit by Provider            Review of Systems  CONSTITUTIONAL: NEGATIVE for fever, chills, change in weight  INTEGUMENTARY/SKIN: a few lesions on her back she'd like checked  EYES: NEGATIVE for vision changes or irritation  ENT: NEGATIVE for ear, mouth and throat problems  RESP:recent URI  BREAST: NEGATIVE for masses, tenderness or discharge  CV: NEGATIVE for chest pain, palpitations or peripheral edema  GI: NEGATIVE for nausea, abdominal pain, heartburn, or change in bowel habits  : NEGATIVE for unusual urinary or vaginal symptoms. No vaginal bleeding.  MUSCULOSKELETAL:chronic neck pain.    NEURO: NEGATIVE for weakness, dizziness or paresthesias  PSYCHIATRIC: NEGATIVE for changes in mood or affect      OBJECTIVE:   /70 (BP Location: Right arm, Patient Position: Chair, Cuff Size: Adult Large)  Pulse 76  Temp 97.6  F (36.4  C) (Temporal)  Resp 16  Wt 194 lb 14.4 oz (88.4 kg)  BMI 32.94 kg/m2  Physical Exam  GENERAL APPEARANCE: healthy, alert and no distress  EYES: Eyes grossly normal to inspection, PERRL and conjunctivae and sclerae normal  HENT: ear canals and TM's normal, nose and mouth without ulcers or lesions, oropharynx clear and oral mucous membranes moist  NECK: no adenopathy, no asymmetry, masses, or scars and thyroid normal to palpation  RESP: lungs clear to auscultation - no rales, rhonchi or wheezes  BREAST: normal without masses, tenderness or nipple discharge and no palpable axillary masses or adenopathy  CV: regular rate and rhythm, normal S1 S2, no S3 or S4, no murmur, click or rub, no peripheral edema and peripheral pulses strong  ABDOMEN: soft, nontender, no hepatosplenomegaly, no masses and bowel sounds normal   (female): atrophic vaginal tissue, retroverted uterus, no masses, poor pelvic support  MS: cervical tenderness down into her thoracic spine.  SKIN: keratoses -  seborrheic  NEURO: Normal strength and tone, sensory exam grossly normal, mentation intact and speech normal  PSYCH: mentation appears normal and affect normal/bright    Diagnostic Test Results:  Results for orders placed or performed during the hospital encounter of 11/02/17   MR Cervical Spine w/o Contrast    Narrative    MRI CERVICAL SPINE WITHOUT CONTRAST November 2, 2017 10:18 AM     HISTORY: Neck pain and right greater than left.    TECHNIQUE: Multiplanar, multisequence MRI of the cervical spine  without contrast.     COMPARISON: None.    FINDINGS: Postoperative changes of anterior cervical spine fusion at  C4-C5. Surgical hardware causes susceptibility artifact which slightly  limits evaluation in that area.     There is straightening of the normal cervical lordosis. Anterior  posterior alignment of the spine is within normal limits. Vertebral  body height is maintained without evidence of fracture. There are no  destructive osseous lesions. There is mild loss of intervertebral disc  space with disc desiccation at C5-C6 and C6-C7 and to a lesser extent  C3-C4.    The cervical spinal cord and visualized portions of the thoracic  spinal cord appear normal. The visualized portions of the brainstem  and cerebellum appear normal.    Level by level as follows:    C2-C3: Left greater than right facet hypertrophy without spinal canal  or neural foraminal narrowing.     C3-C4: Left-sided facet hypertrophy causes mild left neural foraminal  narrowing. No significant right neural foraminal narrowing. No spinal  canal stenosis.     C4-C5: Postoperative changes of anterior fusion. No obvious spinal  canal or neural foraminal narrowing appreciated.    C5-C6: Small posterior disc osteophyte complex without significant  spinal canal narrowing. Right greater than left uncinate spurring and  facet hypertrophy results in mild right neural foraminal narrowing. No  significant left neural foraminal narrowing.    C6-C7:  Circumferential disc bulge without significant spinal canal  narrowing. Left greater than right facet hypertrophy without  significant neural foraminal narrowing.     C7-T1: Bilateral facet hypertrophy left greater than right without  significant spinal canal or neural foraminal narrowing.     Paraspinous soft tissues are unremarkable.      Impression    IMPRESSION:    1. Postoperative changes of anterior cervical spine fusion from C4 to  C5.  2. Degenerative changes in the cervical spine as described above.      PALAK PHILLIPS MD       ASSESSMENT/PLAN:   1. Routine general medical examination at a health care facility  Reviewed recommended screenings and ordered appropriate testing for pt's risks and per pt's request(s).  She like to get her lab testing done at a future date when she is fasting.  - HIV Screening; Future  - Comprehensive metabolic panel; Future  - Lipid panel reflex to direct LDL Fasting; Future  - HPV High Risk Types DNA Cervical  - Pap imaged thin layer screen with HPV - recommended age 30 - 65 years (select HPV order below)    2. Chronic neck pain  Fair control at night with the Flexeril and gabapentin.  Patient is not taking these during the day.  She does use some ibuprofen during the day.  Her pain really does seem to be localized to her neck, so will refer her back to cervical spine specialist for possible injections.  I also recommended physical therapy.  She is considering going to chiropractic.  Finally, she wants a referral to rheumatology to evaluate for possible fibromyalgia.  - cyclobenzaprine (FLEXERIL) 10 MG tablet; Take 1 tablet (10 mg) by mouth 2 times daily as needed for muscle spasms  Dispense: 90 tablet; Refill: 3  - gabapentin (NEURONTIN) 600 MG tablet; Take 1 tablet (600 mg) by mouth 3 times daily  Dispense: 270 tablet; Refill: 3  - RHEUMATOLOGY REFERRAL  - ORTHO  REFERRAL  - PHYSICAL THERAPY REFERRAL    3. Myalgia  I did not feel she quite met the criteria for  "fibromyalgia on my testing today.  She did request referral to rheumatology, so we will see with anything.    4. Psychophysiological insomnia  Fair control with current regimen.  Will continue.    5. S/P carpal tunnel release  Doing well after surgery.  Symptoms are much better.  We will follow and assist as able.    6. Rhinitis, non-allergic  Stable, continue Flonase.  - fluticasone (FLONASE) 50 MCG/ACT spray; Spray 1-2 sprays into both nostrils daily  Dispense: 16 g; Refill: 3    7. Cough  Doing well with albuterol.  Will continue.    8. Screening for HIV (human immunodeficiency virus)  Screening test ordered.  - HIV Screening; Future    9. Screening for malignant neoplasm of cervix  Pap obtained today.    Portions of this note were completed using Dragon dictation software.  Although reviewed, there may be typographical and other inadvertent errors that remain.     Spent at least half of a 40 minute visit counseling patient on options and coordinating patient's care.  See above for additional details.         COUNSELING:  Reviewed preventive health counseling, as reflected in patient instructions       Regular exercise       Healthy diet/nutrition       Osteoporosis Prevention/Bone Health       HIV screeninx in teen years, 1x in adult years, and at intervals if high risk       The 10-year ASCVD risk score ([place] CHARLETTE Jr, et al., 2013) is: 0.8%    Values used to calculate the score:      Age: 52 years      Sex: Female      Is Non- : No      Diabetic: No      Tobacco smoker: No      Systolic Blood Pressure: 100 mmHg      Is BP treated: No      HDL Cholesterol: 60 mg/dL      Total Cholesterol: 177 mg/dL    BP Readings from Last 1 Encounters:   18 100/70     Estimated body mass index is 32.94 kg/(m^2) as calculated from the following:    Height as of 5/10/18: 5' 4.5\" (1.638 m).    Weight as of this encounter: 194 lb 14.4 oz (88.4 kg).      Weight management plan: Discussed healthy diet " and exercise guidelines and patient will follow up in 6 months in clinic to re-evaluate.     reports that she quit smoking about 12 years ago. Her smoking use included Cigarettes. She has a 7.50 pack-year smoking history. She has never used smokeless tobacco.      Counseling Resources:  ATP IV Guidelines  Pooled Cohorts Equation Calculator  Breast Cancer Risk Calculator  FRAX Risk Assessment  ICSI Preventive Guidelines  Dietary Guidelines for Americans, 2010  USDA's MyPlate  ASA Prophylaxis  Lung CA Screening    Tom Cruz MD, MD  [facility]

## 2018-09-05 ENCOUNTER — OFFICE VISIT (OUTPATIENT)
Dept: FAMILY MEDICINE | Facility: OTHER | Age: 53
End: 2018-09-05
Payer: COMMERCIAL

## 2018-09-05 VITALS
WEIGHT: 194.9 LBS | SYSTOLIC BLOOD PRESSURE: 100 MMHG | BODY MASS INDEX: 32.94 KG/M2 | RESPIRATION RATE: 16 BRPM | DIASTOLIC BLOOD PRESSURE: 70 MMHG | TEMPERATURE: 97.6 F | HEART RATE: 76 BPM

## 2018-09-05 DIAGNOSIS — Z00.00 ROUTINE GENERAL MEDICAL EXAMINATION AT A HEALTH CARE FACILITY: Primary | ICD-10-CM

## 2018-09-05 DIAGNOSIS — G89.29 CHRONIC NECK PAIN: ICD-10-CM

## 2018-09-05 DIAGNOSIS — Z11.4 SCREENING FOR HIV (HUMAN IMMUNODEFICIENCY VIRUS): ICD-10-CM

## 2018-09-05 DIAGNOSIS — Z12.4 SCREENING FOR MALIGNANT NEOPLASM OF CERVIX: ICD-10-CM

## 2018-09-05 DIAGNOSIS — Z98.890 S/P CARPAL TUNNEL RELEASE: ICD-10-CM

## 2018-09-05 DIAGNOSIS — F51.04 PSYCHOPHYSIOLOGICAL INSOMNIA: ICD-10-CM

## 2018-09-05 DIAGNOSIS — M54.2 CHRONIC NECK PAIN: ICD-10-CM

## 2018-09-05 DIAGNOSIS — R05.9 COUGH: ICD-10-CM

## 2018-09-05 DIAGNOSIS — J31.0 RHINITIS, NON-ALLERGIC: ICD-10-CM

## 2018-09-05 DIAGNOSIS — M79.10 MYALGIA: ICD-10-CM

## 2018-09-05 PROCEDURE — G0476 HPV COMBO ASSAY CA SCREEN: HCPCS | Performed by: FAMILY MEDICINE

## 2018-09-05 PROCEDURE — 99214 OFFICE O/P EST MOD 30 MIN: CPT | Mod: 25 | Performed by: FAMILY MEDICINE

## 2018-09-05 PROCEDURE — 99396 PREV VISIT EST AGE 40-64: CPT | Performed by: FAMILY MEDICINE

## 2018-09-05 PROCEDURE — G0123 SCREEN CERV/VAG THIN LAYER: HCPCS | Performed by: FAMILY MEDICINE

## 2018-09-05 RX ORDER — ALBUTEROL SULFATE 90 UG/1
2 AEROSOL, METERED RESPIRATORY (INHALATION) EVERY 4 HOURS PRN
Qty: 1 INHALER | Refills: 3 | Status: SHIPPED | OUTPATIENT
Start: 2018-09-05

## 2018-09-05 RX ORDER — ALBUTEROL SULFATE 0.83 MG/ML
SOLUTION RESPIRATORY (INHALATION) EVERY 4 HOURS PRN
Status: CANCELLED | OUTPATIENT
Start: 2018-09-05

## 2018-09-05 RX ORDER — CYCLOBENZAPRINE HCL 10 MG
10 TABLET ORAL 2 TIMES DAILY PRN
Qty: 90 TABLET | Refills: 3 | Status: SHIPPED | OUTPATIENT
Start: 2018-09-05

## 2018-09-05 RX ORDER — FLUTICASONE PROPIONATE 50 MCG
1-2 SPRAY, SUSPENSION (ML) NASAL DAILY
Qty: 16 G | Refills: 3 | Status: SHIPPED | OUTPATIENT
Start: 2018-09-05 | End: 2019-09-03

## 2018-09-05 RX ORDER — GABAPENTIN 600 MG/1
600 TABLET ORAL 3 TIMES DAILY
Qty: 270 TABLET | Refills: 3 | Status: SHIPPED | OUTPATIENT
Start: 2018-09-05

## 2018-09-05 ASSESSMENT — PAIN SCALES - GENERAL: PAINLEVEL: EXTREME PAIN (8)

## 2018-09-05 ASSESSMENT — PATIENT HEALTH QUESTIONNAIRE - PHQ9
SUM OF ALL RESPONSES TO PHQ QUESTIONS 1-9: 0
10. IF YOU CHECKED OFF ANY PROBLEMS, HOW DIFFICULT HAVE THESE PROBLEMS MADE IT FOR YOU TO DO YOUR WORK, TAKE CARE OF THINGS AT HOME, OR GET ALONG WITH OTHER PEOPLE: NOT DIFFICULT AT ALL
SUM OF ALL RESPONSES TO PHQ QUESTIONS 1-9: 0

## 2018-09-05 NOTE — MR AVS SNAPSHOT
After Visit Summary   9/5/2018    Zuri Reza    MRN: [id number]           Patient Information     Date Of Birth          1965        Visit Information        Provider Department      9/5/2018 3:00 PM Tom Cruz MD [facility]        Today's Diagnoses     Routine general medical examination at a health care facility    -  1    Chronic neck pain        Myalgia        Psychophysiological insomnia        S/P carpal tunnel release        Rhinitis, non-allergic        Cough        Screening for HIV (human immunodeficiency virus)        Screening for malignant neoplasm of cervix          Care Instructions    You might check with your medical marijuana specialist to see if a high-CBD oil would be more helpful for you.    I think it would be helpful to see a spine specialist for a possible neck injection.    Physical therapy is also recommended.    Can see rheumatology to get their opinion too.    See me in a few weeks to get your pap done and your fasting labs.    Contact us or return if questions or concerns.     Have a nice day!    Dr. Cruz       Preventive Health Recommendations  Female Ages 50 - 64    Yearly exam: See your health care provider every year in order to  o Review health changes.   o Discuss preventive care.    o Review your medicines if your doctor has prescribed any.      Get a Pap test every three years (unless you have an abnormal result and your provider advises testing more often).    If you get Pap tests with HPV test, you only need to test every 5 years, unless you have an abnormal result.     You do not need a Pap test if your uterus was removed (hysterectomy) and you have not had cancer.    You should be tested each year for STDs (sexually transmitted diseases) if you're at risk.     Have a mammogram every 1 to 2 years.    Have a colonoscopy at age 50, or have a yearly FIT test (stool test). These exams screen for colon cancer.      Have a  cholesterol test every 5 years, or more often if advised.    Have a diabetes test (fasting glucose) every three years. If you are at risk for diabetes, you should have this test more often.     If you are at risk for osteoporosis (brittle bone disease), think about having a bone density scan (DEXA).    Shots: Get a flu shot each year. Get a tetanus shot every 10 years.    Nutrition:     Eat at least 5 servings of fruits and vegetables each day.    Eat whole-grain bread, whole-wheat pasta and brown rice instead of white grains and rice.    Get adequate Calcium and Vitamin D.     Lifestyle    Exercise at least 150 minutes a week (30 minutes a day, 5 days a week). This will help you control your weight and prevent disease.    Limit alcohol to one drink per day.    No smoking.     Wear sunscreen to prevent skin cancer.     See your dentist every six months for an exam and cleaning.    See your eye doctor every 1 to 2 years.            Follow-ups after your visit        Additional Services     ORTHO  REFERRAL       [facility] is referring you to the Orthopedic  Services at [facility].       The  Representative will assist you in the coordination of your Orthopedic and Musculoskeletal Care as prescribed by your physician.    The  Representative will call you within 1 business day to help schedule your appointment, or you may contact the  Representative at:    All areas ~ [phone]     Type of Referral : Spine: Cervical / Thoracic: Medical Spine Specialist        Timeframe requested: Routine    Coverage of these services is subject to the terms and limitations of your health insurance plan.  Please call member services at your health plan with any benefit or coverage questions.      If X-rays, CT or MRI's have been performed, please contact the facility where they were done to arrange for , prior to your scheduled appointment.   "Please bring this referral request to your appointment and present it to your specialist.            PHYSICAL THERAPY REFERRAL       *This therapy referral will be filtered to a centralized scheduling office at [facility] and the patient will receive a call to schedule an appointment at a [place] location most convenient for them. *     [facility] provides Physical Therapy evaluation and treatment and many specialty services across the [place] system.  If requesting a specialty program, please choose from the list below.    If you have not heard from the scheduling office within 2 business days, please call [phone] for all locations, with the exception of [place], please call [phone] and [facility], please call [phone]  Treatment: Evaluation & Treatment  Special Instructions/Modalities:   Special Programs:     Please be aware that coverage of these services is subject to the terms and limitations of your health insurance plan.  Call member services at your health plan with any benefit or coverage questions.      **Note to Provider:  If you are referring outside of [place] for the therapy appointment, please list the name of the location in the \"special instructions\" above, print the referral and give to the patient to schedule the appointment.            RHEUMATOLOGY REFERRAL       Your provider has referred you to: [facility] [phone]   [email or web address]/  [facility]: [facility] [place] [facility] [phone]   [email or web address]    Please be aware that coverage of these services is subject to the terms and limitations of your health insurance plan.  Call member services at your health plan with any benefit or coverage questions.      Please bring the following with you to your appointment:    (1) Any X-Rays, CTs or MRIs " which have been performed.  Contact the facility where they were done to arrange for  prior to your scheduled appointment.    (2) List of current medications   (3) This referral request   (4) Any documents/labs given to you for this referral                  Your next 10 appointments already scheduled     Sep 05, 2018  3:00 PM CDT   [facility]    [address] [facility] [address] [phone]            Sep 21, 2018  8:00 AM CDT   Office Visit with Tom Cruz MD   [facility]    [address] [address] [phone]           Bring a current list of meds and any records pertaining to this visit. For Physicals, please bring immunization records and any forms needing to be filled out. Please arrive 10 minutes early to complete paperwork.              Future tests that were ordered for you today     Open Future Orders        Priority Expected Expires Ordered    Comprehensive metabolic panel Routine  3/4/2019 9/5/2018    Lipid panel reflex to direct LDL Fasting Routine  3/4/2019 9/5/2018    HIV Screening Routine  9/5/2019 9/5/2018            Who to contact     If you have questions or need follow up information about today's clinic visit or your schedule please contact [facility] directly at [phone].  Normal or non-critical lab and imaging results will be communicated to you by MyChart, letter or phone within 4 business days after the clinic has received the results. If you do not hear from us within 7 days, please contact the clinic through MyChart or phone. If you have a critical or abnormal lab result, we will notify you by phone as soon as possible.  Submit refill requests through [employer] or call your pharmacy and they will forward the refill request to us. Please allow 3 business days for your refill to be  "completed.          Additional Information About Your Visit        [employer] Information     [employer] lets you send messages to your doctor, view your test results, renew your prescriptions, schedule appointments and more. To sign up, go to [email or web address] . Click on \"Log in\" on the left side of the screen, which will take you to the Welcome page. Then click on \"Sign up Now\" on the right side of the page.     You will be asked to enter the access code listed below, as well as some personal information. Please follow the directions to create your username and password.     Your access code is: [email or web address]  Expires: 2018  1:23 PM     Your access code will  in 90 days. If you need help or a new code, please call your [facility] clinic or [phone].        Care EveryWhere ID     This is your Care EveryWhere ID. This could be used by other organizations to access your [facility] medical records  [id number]        Your Vitals Were     Pulse Temperature Respirations BMI (Body Mass Index)          76 97.6  F (36.4  C) (Temporal) 16 32.94 kg/m2         Blood Pressure from Last 3 Encounters:   18 100/70   05/10/18 111/84   18 100/66    Weight from Last 3 Encounters:   18 194 lb 14.4 oz (88.4 kg)   18 199 lb (90.3 kg)   18 199 lb (90.3 kg)              We Performed the Following     ORTHO  REFERRAL     PHYSICAL THERAPY REFERRAL     RHEUMATOLOGY REFERRAL          Where to get your medicines      These medications were sent to [facility] [address] [place] [address] [address] [facility] [address] [place]  [address] [facility] [address] [facility] [address]     Phone:  [phone]     albuterol 108 (90 Base) MCG/ACT inhaler    cyclobenzaprine 10 MG tablet    fluticasone 50 MCG/ACT spray    gabapentin 600 MG tablet          Primary Care Provider Office Phone # Fax #    Tom Cruz [phone] [address] [place] [address]        Equal Access to Services     DOREEN LINDA AH: " Hadii aad ku hadkailasho Soomaali, waaxda luqadaha, qaybta kaalmada allan, debbie mcleod. So [facility] [phone].    ATENCIÓN: Si lupe valdes, tiene a marquez disposición servicios gratuitos de asistencia lingüística. Melviame al [phone].    We comply with applicable federal civil rights laws and [place] laws. We do not discriminate on the basis of race, color, national origin, age, disability, sex, sexual orientation, or gender identity.            Thank you!     Thank you for choosing [facility]  for your care. Our goal is always to provide you with excellent care. Hearing back from our patients is one way we can continue to improve our services. Please take a few minutes to complete the written survey that you may receive in the mail after your visit with us. Thank you!             Your Updated Medication List - Protect others around you: Learn how to safely use, store and throw away your medicines at www.disposemymeds.org.          This list is accurate as of 9/5/18  1:24 PM.  Always use your most recent med list.                   Brand Name Dispense Instructions for use Diagnosis    * albuterol (2.5 MG/3ML) 0.083% neb solution     1 Box    Take 3 mLs by nebulization every 4 hours as needed for shortness of breath / dyspnea.    Cough       * albuterol 108 (90 Base) MCG/ACT inhaler    PROAIR HFA/PROVENTIL HFA/VENTOLIN HFA    1 Inhaler    Inhale 2 puffs into the lungs every 4 hours as needed for shortness of breath / dyspnea        cyclobenzaprine 10 MG tablet    FLEXERIL    90 tablet    Take 1 tablet (10 mg) by mouth 2 times daily as needed for muscle spasms    Chronic neck pain       fluticasone 50 MCG/ACT spray    FLONASE    16 g    Spray 1-2 sprays into both nostrils daily    Rhinitis, non-allergic       gabapentin 600 MG tablet    NEURONTIN    270 tablet    Take 1 tablet (600 mg) by mouth 3 times daily    Chronic neck pain       ibuprofen 800 MG tablet     ADVIL/MOTRIN    270 tablet    Take 1 tablet (800 mg) by mouth every 8 hours as needed    Chronic neck pain       * Notice:  This list has 2 medication(s) that are the same as other medications prescribed for you. Read the directions carefully, and ask your doctor or other care provider to review them with you.

## 2018-09-05 NOTE — LETTER
September 13, 2018    Zuri Reza  [address] [facility] [address]    Dear Zuri,  We are happy to inform you that your PAP smear result from 9/5/18 is normal.  We are now able to do a follow up test on PAP smears. The DNA test is for HPV (Human Papilloma Virus). Cervical cancer is closely linked with certain types of HPV. Your results showed no evidence of high risk HPV.  Therefore we recommend you return in 5 years for your next pap smear and HPV test.  You will still need to return to the clinic every year for an annual exam and other preventive tests.  Please contact the clinic at [phone] with any questions.  Sincerely,    Tom Cruz MD/markus

## 2018-09-05 NOTE — LETTER
[address] [address]  Phone: [phone]    September 5, 2018        Zuri [facility] [address]          To whom it may concern:    RE: Zuri Carterzenaida    Patient was seen and treated today at our clinic.  Patient may return to work 9/6/18 with the following:  Please limit work hours to 20/week due to chronic neck pain    Please contact me for questions or concerns.      Sincerely,        Tom Cruz MD

## 2018-09-06 ASSESSMENT — PATIENT HEALTH QUESTIONNAIRE - PHQ9: SUM OF ALL RESPONSES TO PHQ QUESTIONS 1-9: 0

## 2018-09-10 LAB
COPATH REPORT: NORMAL
PAP: NORMAL

## 2018-09-11 LAB
FINAL DIAGNOSIS: NORMAL
HPV HR 12 DNA CVX QL NAA+PROBE: NEGATIVE
HPV16 DNA SPEC QL NAA+PROBE: NEGATIVE
HPV18 DNA SPEC QL NAA+PROBE: NEGATIVE
SPECIMEN DESCRIPTION: NORMAL
SPECIMEN SOURCE CVX/VAG CYTO: NORMAL

## 2018-09-13 ENCOUNTER — OFFICE VISIT (OUTPATIENT)
Dept: NEUROSURGERY | Facility: CLINIC | Age: 53
End: 2018-09-13
Payer: COMMERCIAL

## 2018-09-13 VITALS — OXYGEN SATURATION: 100 % | BODY MASS INDEX: 29.66 KG/M2 | HEIGHT: 65 IN | WEIGHT: 178 LBS | HEART RATE: 95 BPM

## 2018-09-13 DIAGNOSIS — M54.2 CERVICALGIA: ICD-10-CM

## 2018-09-13 DIAGNOSIS — Z98.1 S/P CERVICAL SPINAL FUSION: Primary | ICD-10-CM

## 2018-09-13 DIAGNOSIS — Z11.4 SCREENING FOR HIV (HUMAN IMMUNODEFICIENCY VIRUS): ICD-10-CM

## 2018-09-13 DIAGNOSIS — Z00.00 ROUTINE GENERAL MEDICAL EXAMINATION AT A HEALTH CARE FACILITY: ICD-10-CM

## 2018-09-13 LAB
ALBUMIN SERPL-MCNC: 3.6 G/DL (ref 3.4–5)
ALP SERPL-CCNC: 51 U/L (ref 40–150)
ALT SERPL W P-5'-P-CCNC: 28 U/L (ref 0–50)
ANION GAP SERPL CALCULATED.3IONS-SCNC: 6 MMOL/L (ref 3–14)
AST SERPL W P-5'-P-CCNC: 20 U/L (ref 0–45)
BILIRUB SERPL-MCNC: 0.4 MG/DL (ref 0.2–1.3)
BUN SERPL-MCNC: 15 MG/DL (ref 7–30)
CALCIUM SERPL-MCNC: 8.9 MG/DL (ref 8.5–10.1)
CHLORIDE SERPL-SCNC: 106 MMOL/L (ref 94–109)
CHOLEST SERPL-MCNC: 182 MG/DL
CO2 SERPL-SCNC: 30 MMOL/L (ref 20–32)
CREAT SERPL-MCNC: 0.83 MG/DL (ref 0.52–1.04)
GFR SERPL CREATININE-BSD FRML MDRD: 71 ML/MIN/1.7M2
GLUCOSE SERPL-MCNC: 80 MG/DL (ref 70–99)
HDLC SERPL-MCNC: 66 MG/DL
LDLC SERPL CALC-MCNC: 103 MG/DL
NONHDLC SERPL-MCNC: 116 MG/DL
POTASSIUM SERPL-SCNC: 3.8 MMOL/L (ref 3.4–5.3)
PROT SERPL-MCNC: 7.4 G/DL (ref 6.8–8.8)
SODIUM SERPL-SCNC: 142 MMOL/L (ref 133–144)
TRIGL SERPL-MCNC: 67 MG/DL

## 2018-09-13 PROCEDURE — 87389 HIV-1 AG W/HIV-1&-2 AB AG IA: CPT | Performed by: FAMILY MEDICINE

## 2018-09-13 PROCEDURE — 80061 LIPID PANEL: CPT | Performed by: FAMILY MEDICINE

## 2018-09-13 PROCEDURE — 80053 COMPREHEN METABOLIC PANEL: CPT | Performed by: FAMILY MEDICINE

## 2018-09-13 PROCEDURE — 36415 COLL VENOUS BLD VENIPUNCTURE: CPT | Performed by: FAMILY MEDICINE

## 2018-09-13 PROCEDURE — 99213 OFFICE O/P EST LOW 20 MIN: CPT | Performed by: PHYSICIAN ASSISTANT

## 2018-09-13 ASSESSMENT — PAIN SCALES - GENERAL: PAINLEVEL: MODERATE PAIN (4)

## 2018-09-13 NOTE — LETTER
"    9/13/2018         RE: Zuri Reza  [address]  [facility] [address]        Dear Colleague,    Thank you for referring your patient, Zuri Reza, to the [facility]. Please see a copy of my visit note below.    [facility]  Neurosurgery followup:    HPI: Zuri Reza is a 52 year old female who presents for evaluation of chronic neck pain. Pain is located in neck and radiates down right upper extremity into the first four fingers with he middle being the worse. Unable to describe the pain and states \"I really don't know, it just hurts\" and is constantly there. States she has to have her pillow just right in order to go to sleep. She cannot have any pressure on the back of her neck. History of C4-5 fusion in 2002. She has done injections and physical therapy within the last two years without relief and has no interest in trying these anymore.  Returns today for follow up. I did recommend RUE EMG, which revealed carpal tunnel, and she subsequently underwent CT release on 3/28 with good results. She does continue to have ongoing neck pain into her occiput, bilateral shoulders, and down to her bra line. She has been following with [facility] who prescribed her medicinal marijuana which causes her to feel stoned during the day. She states gabapentin and flexeril have been very helpful at letting her sleep at night. Denies radicular pain to the extremities.   Exam:  Constitutional:  Alert, well nourished, NAD.  HEENT: Normocephalic, atraumatic.   Pulm:  Without shortness of breath   CV:  No pitting edema of BLE.     Neurological:  Awake  Alert  Oriented x 3  Motor exam:     Shoulder Abduction:  Right:  5/5    Left:  5/5  Biceps:                      Right:  5/5    Left:  5/5  Triceps:                     Right:  5/5    Left:  5/5  Wrist Extensors:       Right:  5/5    Left:  5/5  Wrist Flexors:           Right:  5/5    Left:  5/5  Intrinsics:                  " "Right:  5/5    Left:  5/5     Able to spontaneously move U/E bilaterally  Sensation intact throughout all U/E dermatomes   A/P: Zuri Reza is a 52 year old female who presents for evaluation of chronic neck pain. Pain is located in neck and radiates to occiput, bilateral shoulders, and to bra line. EMG with mild CT. She is s/p carpal tunnel release on 3/28 with good results. She has been following with [facility] and does have continued pain. I did discuss other pain management clinics with her and have placed referral to [facility] for comprehensive evaluation. Advised to call with any further questions. Patient voiced understanding and agreement.        Diana Desouza PA-C  [facility]  [address] [address]    Tel [phone]  Pager [phone]      Zuri Reza is a 52 year old female who presents for:  Chief Complaint   Patient presents with     Neurologic Problem     Chronic neck pain/Dr Cruz referred        Initial Vitals:  Pulse 95  Ht 5' 4.5\" (1.638 m)  Wt 178 lb (80.7 kg)  SpO2 100%  BMI 30.08 kg/m2 Estimated body mass index is 30.08 kg/(m^2) as calculated from the following:    Height as of this encounter: 5' 4.5\" (1.638 m).    Weight as of this encounter: 178 lb (80.7 kg).. Body surface area is 1.92 meters squared. BP completed using cuff size: NA (Not Taken)  Moderate Pain (4)    Do you feel safe in your environment?  Yes  Do you need any refills today? No    Nursing Comments:         Paty Hearn      Again, thank you for allowing me to participate in the care of your patient.        Sincerely,        Diana Desouza PA-C    "

## 2018-09-13 NOTE — MR AVS SNAPSHOT
After Visit Summary   9/13/2018    Zuri Reza    MRN: [id number]           Patient Information     Date Of Birth          1965        Visit Information        Provider Department      9/13/2018 1:30 PM Diana Desouza PA-C [facility]        Today's Diagnoses     S/P cervical spinal fusion    -  1    Cervicalgia          Care Instructions    Pain Management referral placed to [facility] (information on brochure)    Please call our office with any further questions at [phone]          Follow-ups after your visit        Additional Services     PAIN MANAGEMENT REFERRAL       [facility] - Comprehensive pain management                  Your next 10 appointments already scheduled     Sep 13, 2018  1:15 PM CDT   LAB with NL LAB PMC   [facility] ([facility]    [address] [place] [address] [id number] [phone]           Please do not eat 10-12 hours before your appointment if you are coming in fasting for labs on lipids, cholesterol, or glucose (sugar). This does not apply to pregnant women. Water, hot tea and black coffee (with nothing added) are okay. Do not drink other fluids, diet soda or chew gum.            Sep 13, 2018  1:30 PM CDT   New Visit with Diana Desouza PA-C   [facility] ([facility])    [address] [place] [address] [id number] [phone]              Who to contact     If you have questions or need follow up information about today's clinic visit or your schedule please contact [facility] directly at [phone].  Normal or non-critical lab and imaging results will be communicated to you by MyChart, letter or phone within 4 business days after the clinic has received the results. If you do not hear from us within 7 days, please contact the clinic through MyChart or phone. If you have a critical or abnormal lab result, we will notify you by phone as soon  "as possible.  Submit refill requests through [employer] or call your pharmacy and they will forward the refill request to us. Please allow 3 business days for your refill to be completed.          Additional Information About Your Visit        [employer] Information     [employer] lets you send messages to your doctor, view your test results, renew your prescriptions, schedule appointments and more. To sign up, go to [email or web address] . Click on \"Log in\" on the left side of the screen, which will take you to the Welcome page. Then click on \"Sign up Now\" on the right side of the page.     You will be asked to enter the access code listed below, as well as some personal information. Please follow the directions to create your username and password.     Your access code is: [email or web address]  Expires: 2018  1:23 PM     Your access code will  in 90 days. If you need help or a new code, please call your [facility] clinic or [phone].        Care EveryWhere ID     This is your Care EveryWhere ID. This could be used by other organizations to access your [facility] medical records  [id number]        Your Vitals Were     Pulse Height Pulse Oximetry BMI (Body Mass Index)          95 5' 4.5\" (1.638 m) 100% 30.08 kg/m2         Blood Pressure from Last 3 Encounters:   18 100/70   05/10/18 111/84   18 100/66    Weight from Last 3 Encounters:   18 178 lb (80.7 kg)   18 194 lb 14.4 oz (88.4 kg)   18 199 lb (90.3 kg)              We Performed the Following     PAIN MANAGEMENT REFERRAL        Primary Care Provider Office Phone # Fax #    Tom Cruz [phone] [address] [place] [address]        Equal Access to Services     [facility] ALEXEI : Mathew layne Sorasheed, waaxda luqadaha, qaybta kaalmada allan, debbie mcleod. [facility] [phone].    ATENCIÓN: Si habla español, tiene a marquez disposición servicios gratuitos de " juniora lingüística. Rosamaria al [phone].    We comply with applicable federal civil rights laws and [place] laws. We do not discriminate on the basis of race, color, national origin, age, disability, sex, sexual orientation, or gender identity.            Thank you!     Thank you for choosing [facility]  for your care. Our goal is always to provide you with excellent care. Hearing back from our patients is one way we can continue to improve our services. Please take a few minutes to complete the written survey that you may receive in the mail after your visit with us. Thank you!             Your Updated Medication List - Protect others around you: Learn how to safely use, store and throw away your medicines at www.disposemymeds.org.          This list is accurate as of 9/13/18 12:49 PM.  Always use your most recent med list.                   Brand Name Dispense Instructions for use Diagnosis    * albuterol (2.5 MG/3ML) 0.083% neb solution     1 Box    Take 3 mLs by nebulization every 4 hours as needed for shortness of breath / dyspnea.    Cough       * albuterol 108 (90 Base) MCG/ACT inhaler    PROAIR HFA/PROVENTIL HFA/VENTOLIN HFA    1 Inhaler    Inhale 2 puffs into the lungs every 4 hours as needed for shortness of breath / dyspnea        cyclobenzaprine 10 MG tablet    FLEXERIL    90 tablet    Take 1 tablet (10 mg) by mouth 2 times daily as needed for muscle spasms    Chronic neck pain       fluticasone 50 MCG/ACT spray    FLONASE    16 g    Spray 1-2 sprays into both nostrils daily    Rhinitis, non-allergic       gabapentin 600 MG tablet    NEURONTIN    270 tablet    Take 1 tablet (600 mg) by mouth 3 times daily    Chronic neck pain       ibuprofen 800 MG tablet    ADVIL/MOTRIN    270 tablet    Take 1 tablet (800 mg) by mouth every 8 hours as needed    Chronic neck pain       * Notice:  This list has 2 medication(s) that are the same as other medications prescribed for you. Read the  directions carefully, and ask your doctor or other care provider to review them with you.

## 2018-09-13 NOTE — PATIENT INSTRUCTIONS
Pain Management referral placed to [facility] (information on brochure)    Please call our office with any further questions at [phone]

## 2018-09-13 NOTE — PROGRESS NOTES
"Zuri Reza is a 52 year old female who presents for:  Chief Complaint   Patient presents with     Neurologic Problem     Chronic neck pain/Dr Cruz referred        Initial Vitals:  Pulse 95  Ht 5' 4.5\" (1.638 m)  Wt 178 lb (80.7 kg)  SpO2 100%  BMI 30.08 kg/m2 Estimated body mass index is 30.08 kg/(m^2) as calculated from the following:    Height as of this encounter: 5' 4.5\" (1.638 m).    Weight as of this encounter: 178 lb (80.7 kg).. Body surface area is 1.92 meters squared. BP completed using cuff size: NA (Not Taken)  Moderate Pain (4)    Do you feel safe in your environment?  Yes  Do you need any refills today? No    Nursing Comments:         Paty Hearn    "

## 2018-09-13 NOTE — PROGRESS NOTES
"[facility]  Neurosurgery followup:    HPI: Zuri Reza is a 52 year old female who presents for evaluation of chronic neck pain. Pain is located in neck and radiates down right upper extremity into the first four fingers with he middle being the worse. Unable to describe the pain and states \"I really don't know, it just hurts\" and is constantly there. States she has to have her pillow just right in order to go to sleep. She cannot have any pressure on the back of her neck. History of C4-5 fusion in 2002. She has done injections and physical therapy within the last two years without relief and has no interest in trying these anymore.  Returns today for follow up. I did recommend RUE EMG, which revealed carpal tunnel, and she subsequently underwent CT release on 3/28 with good results. She does continue to have ongoing neck pain into her occiput, bilateral shoulders, and down to her bra line. She has been following with [place] [facility] who prescribed her medicinal marijuana which causes her to feel stoned during the day. She states gabapentin and flexeril have been very helpful at letting her sleep at night. Denies radicular pain to the extremities.   Exam:  Constitutional:  Alert, well nourished, NAD.  HEENT: Normocephalic, atraumatic.   Pulm:  Without shortness of breath   CV:  No pitting edema of BLE.     Neurological:  Awake  Alert  Oriented x 3  Motor exam:     Shoulder Abduction:  Right:  5/5    Left:  5/5  Biceps:                      Right:  5/5    Left:  5/5  Triceps:                     Right:  5/5    Left:  5/5  Wrist Extensors:       Right:  5/5    Left:  5/5  Wrist Flexors:           Right:  5/5    Left:  5/5  Intrinsics:                  Right:  5/5    Left:  5/5     Able to spontaneously move U/E bilaterally  Sensation intact throughout all U/E dermatomes   A/P: Zuri Reza is a 52 year old female who presents for evaluation of chronic neck pain. Pain is located in neck and " radiates to occiput, bilateral shoulders, and to bra line. EMG with mild CT. She is s/p carpal tunnel release on 3/28 with good results. She has been following with [place] [facility] and does have continued pain. I did discuss other pain management clinics with her and have placed referral to [facility] for comprehensive evaluation. Advised to call with any further questions. Patient voiced understanding and agreement.        Diana Desouza PA-C  [facility]  [address] [address]    Tel [phone]  Pager [phone]

## 2018-09-14 LAB — HIV 1+2 AB+HIV1 P24 AG SERPL QL IA: NONREACTIVE

## 2018-09-19 ENCOUNTER — TELEPHONE (OUTPATIENT)
Dept: FAMILY MEDICINE | Facility: OTHER | Age: 53
End: 2018-09-19

## 2018-09-19 NOTE — TELEPHONE ENCOUNTER
You placed a referral for patient to rheumatology on 9/5/18.  Patient has not scheduled as of yet.      Please review and forward to team if follow up with the patient is needed.     Thank you!  Denae/Clinic Referrals Dyad II

## 2018-09-19 NOTE — TELEPHONE ENCOUNTER
Spoke with patient she will call to schedule in [place]   Closing encounter  Kirsten Roach RT (R)

## 2018-10-11 NOTE — LETTER
[address] [address]  [phone]        March 6, 2018    Re:Zuri Reza       [address] [place] [id number]          To Whom It May Concern:     Zuri Reza was seen in our clinic today 3/1/2018. She may return to work with the following: limited to light duty - lifting no greater than 10 pounds, no repetitive motions and no  duties.  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  She may return to work on or about 3/6/2018.        Sincerely,        Dr. NIKOLAI Cooley MD                   yes

## 2018-10-26 ENCOUNTER — TRANSFERRED RECORDS (OUTPATIENT)
Dept: HEALTH INFORMATION MANAGEMENT | Facility: CLINIC | Age: 53
End: 2018-10-26

## 2018-12-13 NOTE — PROGRESS NOTES
Clinic Care Coordination Contact 11/2/17  [facility]-    Referral Source: PCP  Clinical Data: Care Coordinator Outreach  Outreach attempted x 1.  Left message on voicemail with call back information and requested return call.  Plan: Care Coordinator will try to reach patient again in 3-5 business days.    GEREMIAS Moe  Care Coordinator Social Work    [facility]  [phone]  11/2/2017 4:11 PM             Lisette from [facility] at the [facility] telephoned reporting pt's lab results of:    Treponema pallidum antibody -Positive.  Rapid Plasma Reagin - Negative.    Lisette states they are sending specimens to [facility] for confirmation.      Messaged forwarded to Dr. CAMERON Langston for review and consideration.  Sheri Baron RN on 12/13/2018 at 12:58 PM

## 2019-01-25 ENCOUNTER — TRANSFERRED RECORDS (OUTPATIENT)
Dept: HEALTH INFORMATION MANAGEMENT | Facility: CLINIC | Age: 54
End: 2019-01-25

## 2019-02-25 ENCOUNTER — TELEPHONE (OUTPATIENT)
Dept: FAMILY MEDICINE | Facility: OTHER | Age: 54
End: 2019-02-25

## 2019-02-25 NOTE — TELEPHONE ENCOUNTER
Reason for Call:  Form, our goal is to have forms completed with 72 hours, however, some forms may require a visit or additional information.    Type of letter, form or note:  medical    Who is the form from?: [employer] (if other please explain)    Where did the form come from: form was faxed in    What clinic location was the form placed at?: [facility] - [phone]    Where the form was placed: 's Box    What number is listed as a contact on the form?: fax [phone]       Additional comments: Please complete and fax    Call taken on 2/25/2019 at 11:56 AM by Pily Berg

## 2019-02-27 NOTE — TELEPHONE ENCOUNTER
I do not have the capability here to do functional capacity evaluations, which is basically what's been requested.  We can certainly refer her to a facility which does do this.  If she would like me to partially fill out this paperwork, would still require an office visit to review the several pages of information.    (Form placed in Forms box.)

## 2019-02-28 NOTE — TELEPHONE ENCOUNTER
Patient would like information on who would be able to complete form for her. She was not aware her  had sent this form but after reviewing it with her she  Does need it completed.     Patient also to update Dr. Cruz that she has now had 3 nerve blocks done and had the left side of her neck nerves burned and is having the right done in a week from Friday. She would like to know if you wanted to see her back in clinic in the near future.     Johnny Og,

## 2019-03-04 NOTE — TELEPHONE ENCOUNTER
I gave patient the message from below, she has further questions. Please call her back after 2:00 pm on 3-5-19

## 2019-03-04 NOTE — TELEPHONE ENCOUNTER
Left message for patient to return call. If call is returned please see message below    Vanessa Washington MA

## 2019-03-05 NOTE — TELEPHONE ENCOUNTER
Spoke with patient informed her of message below, she states that she would like a copy of the form mailed to her as she would like to know what form it is that her  sent to us.   I have put a copy of this in the mail.    She also wanted you to know that she is getting her nerves on the right side of her neck burned this Friday 3/8/2019 she states they take 4-6 weeks to heal and grow new nerves.  She is also seeing Rheumatology on 3/25/2019    Original form is in Dr Cruz forms bin

## 2019-05-29 ENCOUNTER — TRANSFERRED RECORDS (OUTPATIENT)
Dept: HEALTH INFORMATION MANAGEMENT | Facility: CLINIC | Age: 54
End: 2019-05-29

## 2019-06-16 NOTE — IP AVS SNAPSHOT
MRN:[id number]                      After Visit Summary   3/28/2018    Zuri Reza    MRN: [id number]           Thank you!     Thank you for choosing [facility] for your care. Our goal is always to provide you with excellent care. Hearing back from our patients is one way we can continue to improve our services. Please take a few minutes to complete the written survey that you may receive in the mail after you visit with us. Thank you!        Patient Information     Date Of Birth          1965        About your hospital stay     You were admitted on:  March 28, 2018 You last received care in the:  [facility]    You were discharged on:  March 28, 2018       Who to Call     For medical emergencies, please call 911.  For non-urgent questions about your medical care, please call your primary care provider or clinic, [phone]  For questions related to your surgery, please call your surgery clinic        Attending Provider     Provider Specialty    Davonte Cooley MD Orthopedics       Primary Care Provider Office Phone # Fax #    Tom Cruz [phone]      After Care Instructions      Diet as Tolerated       Return to diet before surgery, unless instructed otherwise.            Discharge Instructions       Review outpatient procedure discharge instructions with patient as directed by Provider            Ice to affected area       Ice pack to surgical site every 15 minutes per hour for 24 hours            No Dressing Change       No dressing change until follow up appointment.            No weight bearing       With operative extremity            Notify Provider       For signs and symptoms of infection: Fever greater than 101, redness, swelling, heat at site, drainage, pus.            Return to clinic       Return to clinic in 10 days-2 weeks.  appointment should already be made            Shower        Cover dressing if dressing is not going  to be changed today                  Your next 10 appointments already scheduled     Apr 05, 2018  9:30 AM CDT   [facility] with [facility]   [facility]    [address] [place] [id number] [phone]              Further instructions from your care team       [facility] Orthopedic Discharge Instructions For Carpal Tunnel Surgery    Call the Bone and Joint Service Line for after-surgery issues:    [phone]  Pain Control: New home prescriptions Oxycodone 5 mg tablet:  Take one or two tablets every 4-6 hours as needed for pain.  Senna 8.5:  Take one or two tablets twice daily to prevent constipation from narctotic pain medication.    Take your pain medications as prescribed. These medications may make you sleepy. Do not drive, operate equipment, or drink alcohol when taking these.  You may take Tylenol (Generic name is acetaminophen) as directed on the bottle for additional relief or in place of the prescribed pain medications as your pain gets better.  Ibuprofen or Aleve can be used in supplement to the pain prescription and Tylenol if you need. If the medications cause a reaction such as nausea or skin rash, stop taking them and contact your doctor.  Please plan accordingly, pain medications will not be re-filled on the weekends or at night.  Call the office during the day if you need more medications.   Wound Care/Dressings Try to keep the bandage on until office recheck.  You must keep the bandage dry.    Do not immerse your hand in water until the sutures are removed.  Call the office if you have any problems with the dressings.   Activity You may use your hand for light activity.   I encourage you to move your fingers.    Do not lift anything heavy.  Keep your hand higher than your heart.  It will minimize swelling.   Diet Regular.   When to Call the Office Temperature greater than 101.5 degrees  Fahrenheit.  Increasing pain not relieved by medicine, elevation and icing.  Any issues with your dressing.   Follow up Appointment This should have already been med for you.  If not call the office and make an appointment for 7-10 days after surgery.         Based on the surgery/procedure that you had today, we do not anticipate that you will have any problems.  However, given the various responses that patients can have to the surgical experience, we want to ensure that you have information available to manage pain or nausea and what to do if you observe bleeding or you develop any signs and symptoms of infection:  Methods to control pain include:  Prescription pain medication or over the counter medications as prescribed or suggested by your physician.  In addition, ice packs and periods of rest are often helpful.  If your pain is not managed with the above methods, contact your physician.  Methods to control nausea include:  Anti-nausea medication approved by your physician.  Drink clear liquids such as apple juice, ginger ale, broth or 7-Up. Be sure to drink enough fluids.  Move to a regular diet as you feel able.  Rest may also help.  Bleeding:  It's not uncommon to see a little blood staining on the dressing, about the size of a quarter in the first 24 hours; if you see this, there is no reason to be alarmed.  However, should this continue to increase in size, apply pressure if able, ant notify your physician.  Infection:  We do not anticipate that you will acquire an infection, but if you should experience any of the following symptoms:  redness, swelling, heat, increasing pain or abnormal drainage at your surgery site, fever or chills, please notify your physician.    Nurse advice line: [phone]        Pending Results     No orders found from 3/26/2018 to 3/29/2018.            Admission Information     Date & Time Provider Department Dept. Phone    3/28/2018 Davonte Cooley MD [facility]  "Phase -[phone]      Your Vitals Were     Blood Pressure Temperature Respirations Pulse Oximetry          110/86 98  F (36.7  C) (Oral) 10 97%        [employer] Information     [employer] lets you send messages to your doctor, view your test results, renew your prescriptions, schedule appointments and more. To sign up, go to [email or web address] . Click on \"Log in\" on the left side of the screen, which will take you to the Welcome page. Then click on \"Sign up Now\" on the right side of the page.     You will be asked to enter the access code listed below, as well as some personal information. Please follow the directions to create your username and password.     Your access code is: [email or web address]  Expires: 2018  4:34 PM     Your access code will  in 90 days. If you need help or a new code, please call your [facility] clinic or [phone].        Care EveryWhere ID     This is your Care EveryWhere ID. This could be used by other organizations to access your [facility] medical records  [id number]        Equal Access to Services     LOBO LINDA AH: Hadmarva Michelle, shannan luna, jose lemus, debbie buckley . So [facility] [phone].    ATENCIÓN: Si habla español, tiene a marquez disposición servicios gratuitos de asistencia lingüística. Rosamaria al [phone].    We comply with applicable federal civil rights laws and [place] laws. We do not discriminate on the basis of race, color, national origin, age, disability, sex, sexual orientation, or gender identity.               Review of your medicines      START taking        Dose / Directions    oxyCODONE IR 5 MG tablet   Commonly known as:  ROXICODONE   Used for:  S/P carpal tunnel release        Dose:  5-10 mg   Take 1-2 tablets (5-10 mg) by mouth every 4 hours as needed for pain or other (Moderate to Severe)   Quantity:  30 tablet   Refills:  0       senna 8.6 MG tablet   Commonly known as:  SENOKOT   Used " for:  S/P carpal tunnel release        Dose:  1 tablet   Take 1 tablet by mouth 2 times daily as needed for constipation   Quantity:  40 tablet   Refills:  0         CONTINUE these medicines which have NOT CHANGED        Dose / Directions    * albuterol 108 (90 BASE) MCG/ACT Inhaler   Commonly known as:  PROAIR HFA/PROVENTIL HFA/VENTOLIN HFA   Used for:  Bronchitis with bronchospasm        Dose:  2 puff   Inhale 2 puffs into the lungs every 4 hours as needed for shortness of breath / dyspnea.   Quantity:  1 Inhaler   Refills:  3       * albuterol (2.5 MG/3ML) 0.083% neb solution   Used for:  Cough        Dose:  1 ampule   Take 3 mLs by nebulization every 4 hours as needed for shortness of breath / dyspnea.   Quantity:  1 Box   Refills:  0       cyclobenzaprine 10 MG tablet   Commonly known as:  FLEXERIL   Used for:  Chronic neck pain        Dose:  10 mg   Take 1 tablet (10 mg) by mouth 2 times daily as needed for muscle spasms   Quantity:  90 tablet   Refills:  3       fluticasone 50 MCG/ACT spray   Commonly known as:  FLONASE   Used for:  Rhinitis, non-allergic        Dose:  1-2 spray   Spray 1-2 sprays into both nostrils daily   Quantity:  1 Package   Refills:  11       * gabapentin 600 MG tablet   Commonly known as:  NEURONTIN   Used for:  Chronic neck pain        Dose:  600 mg   Take 1 tablet (600 mg) by mouth 3 times daily   Quantity:  270 tablet   Refills:  3       * gabapentin 300 MG capsule   Commonly known as:  NEURONTIN   Used for:  Chronic neck pain        Dose:  300 mg   Take 1 capsule (300 mg) by mouth 3 times daily   Quantity:  270 capsule   Refills:  3       ibuprofen 800 MG tablet   Commonly known as:  ADVIL/MOTRIN   Used for:  Chronic neck pain        Dose:  800 mg   Take 1 tablet (800 mg) by mouth every 8 hours as needed   Quantity:  270 tablet   Refills:  1       * Notice:  This list has 4 medication(s) that are the same as other medications prescribed for you. Read the directions carefully, and  ask your doctor or other care provider to review them with you.      STOP taking     diazepam 5 MG tablet   Commonly known as:  VALIUM                Where to get your medicines      These medications were sent to [facility] - [place], [place] - [address]  [address], [place] [address]     Phone:  [phone]     senna 8.6 MG tablet         Some of these will need a paper prescription and others can be bought over the counter. Ask your nurse if you have questions.     Bring a paper prescription for each of these medications     oxyCODONE IR 5 MG tablet                Protect others around you: Learn how to safely use, store and throw away your medicines at www.disposemymeds.org.        Information about OPIOIDS     PRESCRIPTION OPIOIDS: WHAT YOU NEED TO KNOW    Prescription opioids can be used to help relieve moderate to severe pain and are often prescribed following a surgery or injury, or for certain health conditions. These medications can be an important part of treatment but also come with serious risks. It is important to work with your health care provider to make sure you are getting the safest, most effective care.    WHAT ARE THE RISKS AND SIDE EFFECTS OF OPIOID USE?  Prescription opioids carry serious risks of addiction and overdose, especially with prolonged use. An opioid overdose, often marked by slowed breathing can cause sudden death. The use of prescription opioids can have a number of side effects as well, even when taken as directed:      Tolerance - meaning you might need to take more of a medication for the same pain relief    Physical dependence - meaning you have symptoms of withdrawal when a medication is stopped    Increased sensitivity to pain    Constipation    Nausea, vomiting, and dry mouth    Sleepiness and dizziness    Confusion    Depression    Low levels of testosterone that can result in lower sex drive, energy, and strength    Itching and  sweating    RISKS ARE GREATER WITH:    History of drug misuse, substance use disorder, or overdose    Mental health conditions (such as depression or anxiety)    Sleep apnea    Older age (65 years or older)    Pregnancy    Avoid alcohol while taking prescription opioids.   Also, unless specifically advised by your health care provider, medications to avoid include:    Benzodiazepines (such as Xanax or Valium)    Muscle relaxants (such as Soma or Flexeril)    Hypnotics (such as Ambien or Lunesta)    Other prescription opioids    KNOW YOUR OPTIONS:  Talk to your health care provider about ways to manage your pain that do not involve prescription opioids. Some of these options may actually work better and have fewer risks and side effects:    Pain relievers such as acetaminophen, ibuprofen, and naproxen    Some medications that are also used for depression or seizures    Physical therapy and exercise    Cognitive behavioral therapy, a psychological, goal-directed approach, in which patients learn how to modify physical, behavioral, and emotional triggers of pain and stress    IF YOU ARE PRESCRIBED OPIOIDS FOR PAIN:    Never take opioids in greater amounts or more often than prescribed    Follow up with your primary health care provider and work together to create a plan on how to manage your pain.    Talk about ways to help manage your pain that do not involve prescription opioids    Talk about all concerns and side effects    Help prevent misuse and abuse    Never sell or share prescription opioids    Never use another person's prescription opioids    Store prescription opioids in a secure place and out of reach of others (this may include visitors, children, friends, and family)    Visit www.cdc.gov/drugoverdose to learn about risks of opioid abuse and overdose    If you believe you may be struggling with addiction, tell your health care provider and ask for guidance or call [facility]'s National Helpline at  2-762-893-HELP    LEARN MORE / www.cdc.gov/drugoverdose/prescribing/guideline.html    Safely dispose of unused prescription opioids: Find your local drug take-back programs and more information about the importance of safe disposal at www.doseofreality.[place].gov             Medication List: This is a list of all your medications and when to take them. Check marks below indicate your daily home schedule. Keep this list as a reference.      Medications           Morning Afternoon Evening Bedtime As Needed    * albuterol 108 (90 BASE) MCG/ACT Inhaler   Commonly known as:  PROAIR HFA/PROVENTIL HFA/VENTOLIN HFA   Inhale 2 puffs into the lungs every 4 hours as needed for shortness of breath / dyspnea.                                * albuterol (2.5 MG/3ML) 0.083% neb solution   Take 3 mLs by nebulization every 4 hours as needed for shortness of breath / dyspnea.                                cyclobenzaprine 10 MG tablet   Commonly known as:  FLEXERIL   Take 1 tablet (10 mg) by mouth 2 times daily as needed for muscle spasms                                fluticasone 50 MCG/ACT spray   Commonly known as:  FLONASE   Spray 1-2 sprays into both nostrils daily                                * gabapentin 600 MG tablet   Commonly known as:  NEURONTIN   Take 1 tablet (600 mg) by mouth 3 times daily                                * gabapentin 300 MG capsule   Commonly known as:  NEURONTIN   Take 1 capsule (300 mg) by mouth 3 times daily                                ibuprofen 800 MG tablet   Commonly known as:  ADVIL/MOTRIN   Take 1 tablet (800 mg) by mouth every 8 hours as needed                                oxyCODONE IR 5 MG tablet   Commonly known as:  ROXICODONE   Take 1-2 tablets (5-10 mg) by mouth every 4 hours as needed for pain or other (Moderate to Severe)                                senna 8.6 MG tablet   Commonly known as:  SENOKOT   Take 1 tablet by mouth 2 times daily as needed for constipation                                 * Notice:  This list has 4 medication(s) that are the same as other medications prescribed for you. Read the directions carefully, and ask your doctor or other care provider to review them with you.       Other Other

## 2019-07-18 ENCOUNTER — TELEPHONE (OUTPATIENT)
Dept: FAMILY MEDICINE | Facility: OTHER | Age: 54
End: 2019-07-18

## 2019-07-18 NOTE — TELEPHONE ENCOUNTER
Summary:    Patient is due/failing the following:   MAMMOGRAM and PHYSICAL in September     Action needed:   Patient needs office visit for Physical in September . and schedule a mammogram     Type of outreach:    Phone, spoke to patient.  patient will call back to schedule     Questions for provider review:    None                                                                                                                                    Caitlin Thompson       Chart routed to Care Team .    Panel Management Review      Patient has the following on her problem list: None      Composite cancer screening  Chart review shows that this patient is due/due soon for the following Mammogram

## 2019-07-18 NOTE — LETTER
[facility]  [id number] [place] [id number]  Phone: [phone]  August 16, 2019      Zuri Reza  [address] [place] [address] [facility] [address]      Dear Zuri,    We care about your health and have reviewed your health plan including your medical conditions, medications, and lab results.  Based on this review, it is recommended that you follow up regarding the following health topic(s):  -Breast Cancer Screening  -Wellness (Physical) Visit     We recommend you take the following action(s):  -schedule a MAMMOGRAM which is due. Please disregard this reminder if you have had this exam elsewhere within the last 1-2 years please let us know so we can update your records.     Please call us at the [facility] - [phone] (or use [employer]) to address the above recommendations.     Thank you for trusting [facility] and we appreciate the opportunity to serve you.  We look forward to supporting your healthcare needs in the future.    Healthy Regards,    Your Health Care Team  [facility]

## 2019-09-03 DIAGNOSIS — J31.0 RHINITIS, NON-ALLERGIC: ICD-10-CM

## 2019-09-04 RX ORDER — FLUTICASONE PROPIONATE 50 MCG
SPRAY, SUSPENSION (ML) NASAL
Qty: 16 G | Refills: 0 | Status: SHIPPED | OUTPATIENT
Start: 2019-09-04

## 2019-09-04 NOTE — TELEPHONE ENCOUNTER
Flonase  Medication is being filled for 1 time refill only due to:  Patient needs to be seen because it has been more than one year since last visit.    Leticia Campbell, RN, BSN

## 2019-09-09 NOTE — TELEPHONE ENCOUNTER
Left detailed mess. Re:   Zuri needs to make an annual visit for med refills etc..   She was given 1 johann refill

## 2021-05-03 NOTE — TELEPHONE ENCOUNTER
Can see [facility] for a Functional Capacity Evaluation.    I would be happy to see her at any time.  If she feels she's not getting the improvement she needs with her specialists to date, we can discuss alternative approaches and next steps that we could consider.     25 feet
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
## 2022-08-22 NOTE — LETTER
[facility]  [address] [place] [address]  [phone]      December 28, 2017        Zuri Reza  [address] [facility] [address]      Dear Zuri,    I am a clinic care coordinator who works with Tom Cruz MD, MD at [facility]. I wanted to provide you with my contact information so that you can call me with questions or concerns about your health care. Below is a description of clinic care coordination and how I can further assist you.     The clinic care coordinator is a registered nurse and/or  who understand the health care system. The goal of clinic care coordination is to help you manage your health and improve access to the [facility] system in the most efficient manner. The registered nurse can assist you in meeting your health care goals by providing education, coordinating services, and strengthening the communication among your providers. The  can assist you with financial, behavioral, psychosocial, chemical dependency, counseling, and/or psychiatric resources.    Please feel free to contact me at [phone], with any questions or concerns. We at [facility] are focused on providing you with the highest-quality healthcare experience possible and that all starts with you.     Sincerely,       GEREMIAS Moe   Clinical Care Coordinator  [phone]        
with patient

## 2023-11-02 NOTE — TELEPHONE ENCOUNTER
Post poning, did we receive REBEKAH was mailed on 7/3/2018 back to clinic  Kirsten Roach RT (R)          2

## (undated) DEVICE — ESU GROUND PAD UNIVERSAL W/O CORD

## (undated) DEVICE — GLOVE PROTEXIS BLUE W/NEU-THERA 8.5  2D73EB85

## (undated) DEVICE — BNDG COBAN 2"X5YDS UNSTERILE 2082

## (undated) DEVICE — PACK HAND WRIST FOREARM CUSTOM

## (undated) DEVICE — DRSG GAUZE 4X4" 3033

## (undated) DEVICE — BNDG KLING 3" 2232

## (undated) DEVICE — GLOVE PROTEXIS W/NEU-THERA 8.0  2D73TE80

## (undated) DEVICE — SU ETHILON 3-0 PS-2 18" 1669H

## (undated) DEVICE — BASIN SET MINOR DISP

## (undated) DEVICE — SOL NACL 0.9% IRRIG 1000ML BOTTLE 07138-09

## (undated) DEVICE — GOWN IMPERVIOUS BREATHABLE 2XL/XLONG

## (undated) RX ORDER — BUPIVACAINE HYDROCHLORIDE 5 MG/ML
INJECTION, SOLUTION EPIDURAL; INTRACAUDAL
Status: DISPENSED
Start: 2018-03-28

## (undated) RX ORDER — LIDOCAINE HYDROCHLORIDE 10 MG/ML
INJECTION, SOLUTION EPIDURAL; INFILTRATION; INTRACAUDAL; PERINEURAL
Status: DISPENSED
Start: 2018-03-28